# Patient Record
Sex: FEMALE | Race: OTHER
[De-identification: names, ages, dates, MRNs, and addresses within clinical notes are randomized per-mention and may not be internally consistent; named-entity substitution may affect disease eponyms.]

---

## 2020-11-20 ENCOUNTER — APPOINTMENT (OUTPATIENT)
Dept: GASTROENTEROLOGY | Facility: CLINIC | Age: 40
End: 2020-11-20
Payer: MEDICAID

## 2020-11-20 VITALS
BODY MASS INDEX: 30.36 KG/M2 | SYSTOLIC BLOOD PRESSURE: 116 MMHG | HEART RATE: 66 BPM | TEMPERATURE: 96.1 F | DIASTOLIC BLOOD PRESSURE: 78 MMHG | OXYGEN SATURATION: 98 % | WEIGHT: 165 LBS | RESPIRATION RATE: 14 BRPM | HEIGHT: 62 IN

## 2020-11-20 PROCEDURE — 99203 OFFICE O/P NEW LOW 30 MIN: CPT

## 2020-11-20 NOTE — PHYSICAL EXAM
[Abdomen Soft] : soft [Abdomen Tenderness] : non-tender [] : no hepato-splenomegaly [Abdomen Mass (___ Cm)] : no abdominal mass palpated [FreeTextEntry1] : patient preferred postponing rectal

## 2020-11-20 NOTE — ASSESSMENT
[FreeTextEntry1] : constipation\par \par reviewed the records from ed\par kub; extensive stool in colon\par \par plan\par \par dietary fiber to be increased\par \par DR FIELD'S COMMON SENSE RECOMMENDATIONS FOR IMPROVING CONSTIPATION,  WITH OR WITHOUT PRESCRIPTION MEDICATION\par \par 1.  slowly increase dietary fiber\par 2.  breakfast is especially important for good bowel regime\par 3.  high fiber breakfast cereal such as Kashi or Fiber 1 is a useful way to increase dietary fiber\par 4.  hot beverage or hot cereal may also be helpful at breakfast\par 5.  fluid intake to avoid dehydration;  eight glasses of non caffeinated, non alcoholic fluids per day [64 fll oz]\par 6.  prunes can be helpful; 3-6 per day [alternative is prune juice]\par 7.  add unprocessed bran to foods, beginning with one teaspoon per day\par 8   add flaxseed to foods, starting with one tablespoon and increasing slowly\par \par \par return visit in six weeks\par colonoscopy unless doing really\par \par smoothie with flax or keith seed also is a good idea

## 2020-11-20 NOTE — HISTORY OF PRESENT ILLNESS
[FreeTextEntry1] : patient with recent onset of marble like hard stools, actually strained, presented to ed with vaso motor syncope related to straining\par \par since that time, she is working on diet\par better but still with marble like stools, not a large volume stool \par \par no meds, no pain killers\par \par no family hx of colon cancer\par \par she is in good health\par \par three children, 15 12 and five y old

## 2020-12-30 ENCOUNTER — APPOINTMENT (OUTPATIENT)
Dept: GASTROENTEROLOGY | Facility: CLINIC | Age: 40
End: 2020-12-30
Payer: MEDICAID

## 2020-12-30 PROCEDURE — 99442: CPT

## 2020-12-30 NOTE — HISTORY OF PRESENT ILLNESS
[FreeTextEntry1] : 1. chronic constipation\par \par she has done the best she can for her diet\par \par has used a spoonful of flax seed mixed with cactus\par \par but it contains senna\par \par her bowel movements are somewhat improved\par \par smoothie...every other day\par \par difficult to maintain daily\par \par

## 2021-01-18 ENCOUNTER — RESULT REVIEW (OUTPATIENT)
Age: 41
End: 2021-01-18

## 2021-01-20 ENCOUNTER — RESULT REVIEW (OUTPATIENT)
Age: 41
End: 2021-01-20

## 2021-01-21 ENCOUNTER — RESULT REVIEW (OUTPATIENT)
Age: 41
End: 2021-01-21

## 2021-01-21 ENCOUNTER — APPOINTMENT (OUTPATIENT)
Dept: GASTROENTEROLOGY | Facility: HOSPITAL | Age: 41
End: 2021-01-21

## 2021-02-12 ENCOUNTER — APPOINTMENT (OUTPATIENT)
Dept: NEUROLOGY | Facility: CLINIC | Age: 41
End: 2021-02-12
Payer: MEDICAID

## 2021-02-12 VITALS
BODY MASS INDEX: 29.81 KG/M2 | HEIGHT: 62 IN | WEIGHT: 162 LBS | SYSTOLIC BLOOD PRESSURE: 131 MMHG | DIASTOLIC BLOOD PRESSURE: 83 MMHG | TEMPERATURE: 97.2 F | HEART RATE: 73 BPM

## 2021-02-12 DIAGNOSIS — Z82.49 FAMILY HISTORY OF ISCHEMIC HEART DISEASE AND OTHER DISEASES OF THE CIRCULATORY SYSTEM: ICD-10-CM

## 2021-02-12 DIAGNOSIS — Z83.3 FAMILY HISTORY OF DIABETES MELLITUS: ICD-10-CM

## 2021-02-12 DIAGNOSIS — Z78.9 OTHER SPECIFIED HEALTH STATUS: ICD-10-CM

## 2021-02-12 DIAGNOSIS — Z87.440 PERSONAL HISTORY OF URINARY (TRACT) INFECTIONS: ICD-10-CM

## 2021-02-12 PROCEDURE — 99072 ADDL SUPL MATRL&STAF TM PHE: CPT

## 2021-02-12 PROCEDURE — 99204 OFFICE O/P NEW MOD 45 MIN: CPT

## 2021-02-14 PROBLEM — Z87.440 HISTORY OF URINARY TRACT INFECTION: Status: RESOLVED | Noted: 2021-02-12 | Resolved: 2021-02-14

## 2021-02-14 PROBLEM — Z82.49 FAMILY HISTORY OF HYPERTENSION: Status: ACTIVE | Noted: 2021-02-14

## 2021-02-14 PROBLEM — Z82.49 FAMILY HISTORY OF CARDIAC DISORDER: Status: ACTIVE | Noted: 2021-02-14

## 2021-02-14 PROBLEM — Z78.9 NON-SMOKER: Status: ACTIVE | Noted: 2021-02-12

## 2021-02-14 PROBLEM — Z83.3 FAMILY HISTORY OF DIABETES MELLITUS: Status: ACTIVE | Noted: 2021-02-14

## 2021-02-14 NOTE — DISCUSSION/SUMMARY
[FreeTextEntry1] : Nara is a pleasant 40-year-old lady with history of four seizures, first seizure at age 33, with second seizure years later and now two seizures more recently, with last seizure 2/10/21. \par \par First two seizures appear to be focal motor seizures characterized by arousal from sleep and  hypermotor behavior with retained awareness. Last two seizures occurred out of sleep and appear to be focal to btc seizures.\par \par Given history of 4 seizures, she has epilepsy. Also has right insular hyperintense focus, unclear etiology, may have been a postictal focus or post-infectious etiology but this may be seizure focus with variable propogation --- propogation anteriorly to frontal lobe with hypermotor seizure, and to more distributed networks for focal to btc seizure. \par \par Gertrude was tearful today when discussing diagnosis of possible epilepsy as with her first two episodes years ago, she was told they were likely panic attacks. She has stressors including her mother visiting and was very tearful in office today. She was started on levetiracetam with no interval events although this may not be the best medication for her given possible comorbid anxiety and depression. Her reaction may just be expected as this is new diagnosis and she was told she also couldn't' drive which is very disruptive to her life. Will require close follow-up initially. \par Has allergy to Bactrim which may predispose to problem with lamotrigine. Has IUD, no plan for more children. \par

## 2021-02-14 NOTE — PHYSICAL EXAM
[FreeTextEntry1] : Mental Status: AxOx3, speech: no dysarthria, tearful, dysthymic affect, attention normal, knows president, can calculate number of quarters in $1.50\par STM 3/3 immediate, 2/3 at five minutes, \par CN: visual acuity, VFF, blink to confrontation \par III, IV, VI, PERRL, EOMI \par V sensation normal to light touch, pinprick\par VII normal squint vs resistance, normal smile, face symmetric \par VIII: normal hearing \par IX, X normal gag, symmetric palate, uvula raises midline \par XI normal shrug versus resistance and lateralization of head versus resistance \par XII tongue symmetric, normal strength, no tremor fasciculations \par Motor: full strength throughout , normal tone \par Sensory: normal to LT and vibration \par Reflexes:  \par Brachoradialis, biceps, triceps, patella and ankles 2+ \par Plantar flexor response bilaterally \par Coordination: no dysmetria on FNF \par Gait : normal balance and gait, no ataxic movements

## 2021-02-14 NOTE — REVIEW OF SYSTEMS
[Seizures] : convulsions [Anxiety] : anxiety [Depression] : depression [Fever] : no fever [Chest Pain] : no chest pain [Confused or Disoriented] : no confusion [Abdominal Pain] : no abdominal pain [Pelvic Pain] : no pelvic pain

## 2021-02-14 NOTE — REVIEW OF SYSTEMS
[Seizures] : convulsions [Anxiety] : anxiety [Depression] : depression [Fever] : no fever [Confused or Disoriented] : no confusion [Chest Pain] : no chest pain [Abdominal Pain] : no abdominal pain [Pelvic Pain] : no pelvic pain

## 2021-02-14 NOTE — CONSULT LETTER
[Dear  ___] : Dear ~SELENA, [Sincerely,] : Sincerely, [FreeTextEntry3] : Kadi Brunner MD \par [facility] \par [phone]

## 2021-02-14 NOTE — HISTORY OF PRESENT ILLNESS
[FreeTextEntry1] : Dinorah Reyes is a 40-year-old right-handed lady with a past medical history of four clinical events concerning for seizure. She is presenting after hospital follow-up for a seizure. \par \par As per her report, her first seizure was in [place] in 2013. She was 33-years old at the time.  She felt tired and fell asleep. Woke up making sudden involuntary movements with her hands, R>L. She could not control the movements. She bit her tongue. No drooling or difficulty breathing. She was aware of what was happening but had no control. She fell asleep and awoke again and her tongue was bruised. She went to [facility] in [facility] and was told it may have been a panic attack. She was sent home. Prior to the event, she had no illness. \par \par Second event was out of sleep in 2019, six years later. Her  was sleeping. She awoke out of sleep and had involuntary movements, maybe of right hand greater than left with mouth movements. The event lasted 15-20 seconds. No loss of consciousness, no loss of urine, bit both sides of tongue. She was evaluated by [facility] and was told t his may be a panic attack. \par \par Third seizure was on 1/31/21 around 6 :20 AM.  awoke and witnessed whole body convulsions and foaming at the mouth. He turned her onto her right side more than left. The event lasted 20-30 seconds and then stopped. She was breathing heavily and had lost consciousness. She felt sore afterwards. \par \par The last seizure was on 2/10/21. She was brought to [place] [facility] ED> She was postictal and unable to provide detailed history. As per chart notes,  ED called  who reported she was having generalized tonic clonic seizure with foaming and the mouth and bit her tongue. Seizure lasted a few minutes. Upon arrival to ED, BP was 165/82. O2 96%. She was afebrile and tachycardic at 110. She had tongue bite on right side with edema. No sign of infection. MRI brain showed T2 nonenhancing hyperintense focus within the anterior right insula, unclear etiology. She was discharged on  mg bid. \par \par \par Seizure risk factors: \par No history of brain infection, no family history of epilepsy, no history of meningitis, encephalitis. No history of learning problems. Met developmental milestones okay. No history of episode of head injury with loss of consciousness. Did have episode of high fever, was hallucinating and had visual hallucinations in setting of delirium in the past. 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

## 2021-02-14 NOTE — DATA REVIEWED
[de-identified] : 2/10/21: MRI brain with and without contrast: T2 nonenhancing non masslike hyperintense focus, centered within the anterior right insula corresponding to findings seen on recently performed CT head, nonspecific. Possible diagnostic consideration include prominent focus of gliosis from prior ischemia versus postinfectious/post inflammatory process or post ictal focus. Low - grade glial neoplasm is considered unlikely given morphology and lack of mass effect and enhancement but not entirely excluded. Follow up suggested. No evidence of mesial temporal sclerosis.  [de-identified] : 2/10/21: Creatinine 0.8, AST ALT Alk phos within normal limits

## 2021-02-14 NOTE — ASSESSMENT
[FreeTextEntry1] : -Continue  mg bid - will send prescription to pharmacy \par \par - folic acid 0.4 mg qdaily\par \par if continued depression - will need to add transition medications possibly to trileptal - safe in pregnancy \par \par Will send prescription of intranasal midazolam home \par \par  is supportive and present at night - can witness nocturnal seizures - will get empatica device \par \par No driving for at least 6 months to 1 year \par \par Referred them to epilepsy foundation website for more resources \par \par Referral to neurosurgery due to right insular lesion, suspect this was post-ictal focus or old area of gliosis but would like input regarding when to do follow-up imaging/impression from Dr. Tejada \par \par Return to clinic in one month \par

## 2021-02-14 NOTE — CONSULT LETTER
[Dear  ___] : Dear ~SELENA, [Sincerely,] : Sincerely, [FreeTextEntry3] : Kadi Brunner MD \par [facility] \par [phone]  none present none present

## 2021-02-14 NOTE — HISTORY OF PRESENT ILLNESS
[FreeTextEntry1] : Dinorah Reyes is a 40-year-old right-handed lady with a past medical history of four clinical events concerning for seizure. She is presenting after hospital follow-up for a seizure. \par \par As per her report, her first seizure was in [place] in 2013. She was 33-years old at the time.  She felt tired and fell asleep. Woke up making sudden involuntary movements with her hands, R>L. She could not control the movements. She bit her tongue. No drooling or difficulty breathing. She was aware of what was happening but had no control. She fell asleep and awoke again and her tongue was bruised. She went to [facility] in [facility] and was told it may have been a panic attack. She was sent home. Prior to the event, she had no illness. \par \par Second event was out of sleep in 2019, six years later. Her  was sleeping. She awoke out of sleep and had involuntary movements, maybe of right hand greater than left with mouth movements. The event lasted 15-20 seconds. No loss of consciousness, no loss of urine, bit both sides of tongue. She was evaluated by [facility] and was told t his may be a panic attack. \par \par Third seizure was on 1/31/21 around 6 :20 AM.  awoke and witnessed whole body convulsions and foaming at the mouth. He turned her onto her right side more than left. The event lasted 20-30 seconds and then stopped. She was breathing heavily and had lost consciousness. She felt sore afterwards. \par \par The last seizure was on 2/10/21. She was brought to [place] ED> She was postictal and unable to provide detailed history. As per chart notes,  ED called  who reported she was having generalized tonic clonic seizure with foaming and the mouth and bit her tongue. Seizure lasted a few minutes. Upon arrival to ED, BP was 165/82. O2 96%. She was afebrile and tachycardic at 110. She had tongue bite on right side with edema. No sign of infection. MRI brain showed T2 nonenhancing hyperintense focus within the anterior right insula, unclear etiology. She was discharged on  mg bid. \par \par \par Seizure risk factors: \par No history of brain infection, no family history of epilepsy, no history of meningitis, encephalitis. No history of learning problems. Met developmental milestones okay. No history of episode of head injury with loss of consciousness. Did have episode of high fever, was hallucinating and had visual hallucinations in setting of delirium in the past. 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

## 2021-02-14 NOTE — DATA REVIEWED
[de-identified] : 2/10/21: MRI brain with and without contrast: T2 nonenhancing non masslike hyperintense focus, centered within the anterior right insula corresponding to findings seen on recently performed CT head, nonspecific. Possible diagnostic consideration include prominent focus of gliosis from prior ischemia versus postinfectious/post inflammatory process or post ictal focus. Low - grade glial neoplasm is considered unlikely given morphology and lack of mass effect and enhancement but not entirely excluded. Follow up suggested. No evidence of mesial temporal sclerosis.  [de-identified] : 2/10/21: Creatinine 0.8, AST ALT Alk phos within normal limits

## 2021-03-01 ENCOUNTER — APPOINTMENT (OUTPATIENT)
Dept: NEUROSURGERY | Facility: CLINIC | Age: 41
End: 2021-03-01
Payer: MEDICAID

## 2021-03-01 VITALS
TEMPERATURE: 97.3 F | WEIGHT: 160 LBS | HEIGHT: 62 IN | DIASTOLIC BLOOD PRESSURE: 76 MMHG | HEART RATE: 78 BPM | SYSTOLIC BLOOD PRESSURE: 122 MMHG | BODY MASS INDEX: 29.44 KG/M2

## 2021-03-01 PROCEDURE — 99072 ADDL SUPL MATRL&STAF TM PHE: CPT

## 2021-03-01 PROCEDURE — 99205 OFFICE O/P NEW HI 60 MIN: CPT

## 2021-03-01 NOTE — DATA REVIEWED
[de-identified] : 2/10/21: No hydrocephalus, midline shift, acute intracranial hemorrhage or demarcated territorial infarct [de-identified] : 2/10/21: T2 non-enhancing non-masslike hyperintense focus centered within the anterior right insula corresponding to findings seen on recently performed CT head which is nonspecific and possible diagnostic consideration include prominent focus of gliosis from prior ischemia versus postinfectious/post inflammatory process or postictal focus.  Low-grade glial neoplasm is considered nlikely given morphology f finding as well as lack of surrounding mass effect and enhancement but is not entirely excluded.  Attention on routine follow-up imaging may be helpful to assess for stability may be obtained, as clinically warranted.  No enhancing intracranial lesion identified.  No acute intracranial hemorrhage, acute infarction, extra-axial fluid collection or hydrocephalus.

## 2021-03-01 NOTE — ASSESSMENT
[FreeTextEntry1] : I have discussed the natural history and treatment options for intracranial brain lesions with the patient. I explained the different types of brain lesions and the indications for observation and imaging surveillance, medical management with antiepileptic medications and steroids, chemotherapy, radiation therapy, radiosurgery and surgery. I explained the indications of a combination of these treatment options.  In the end, I recommend imaging surveillance, with follow up in 3 months with repeat MRI brain with and without contrast prior to visit.  The patient understands the plan of care and is in agreement.  All questions answered to patient satisfaction.\par \par KEZIA Krishna\par \par

## 2021-03-01 NOTE — HISTORY OF PRESENT ILLNESS
[de-identified] : DINORAH REYES is a 40 year female with a PMH of Anemia,  UTI, four seizure episodes in her lifetime including recent seizure 2/10/21 seen in [place] ED who presents to the office today as a referral from Dr. Brunner for neurosurgical consultation due to abnormality on recent MRI.  \par \par Ms. Reyes had episode of GTC seizure lasting a few minutes with post ictal state, tongue-bite. CT head showed no hemorrhage.  MRI brain revealed T2 nonenhancing hyperintense focus within the right insula. She was discharged on Keppra 500mg bid and is scheduled for outpatient EEG.  She denies headaches, weakness, numbness, gait abnormality or additional recent seizures.

## 2021-03-01 NOTE — END OF VISIT
[FreeTextEntry3] : I have seen the patient and reviewed the case together with PA and I agree with the final recommendations and plan of care.\par \par Steve Tejada MD\par Neurosurgery\par \par  [Time Spent: ___ minutes] : I have spent [unfilled] minutes of time on the encounter. [>50% of the face to face encounter time was spent on counseling and/or coordination of care for ___] : Greater than 50% of the face to face encounter time was spent on counseling and/or coordination of care for [unfilled]

## 2021-03-12 ENCOUNTER — APPOINTMENT (OUTPATIENT)
Dept: NEUROLOGY | Facility: CLINIC | Age: 41
End: 2021-03-12
Payer: MEDICAID

## 2021-03-12 VITALS
TEMPERATURE: 97.2 F | HEIGHT: 62 IN | DIASTOLIC BLOOD PRESSURE: 54 MMHG | BODY MASS INDEX: 29.08 KG/M2 | HEART RATE: 68 BPM | WEIGHT: 158 LBS | SYSTOLIC BLOOD PRESSURE: 123 MMHG

## 2021-03-12 PROCEDURE — 99072 ADDL SUPL MATRL&STAF TM PHE: CPT

## 2021-03-12 PROCEDURE — 99215 OFFICE O/P EST HI 40 MIN: CPT

## 2021-03-13 NOTE — PHYSICAL EXAM
[FreeTextEntry1] : Mental Status: AxOx3, speech: no dysarthria, euthymic affect, attention normal, knows president \par CN: visual acuity, VFF, blink to confrontation \par III, IV, VI, PERRL, EOMI \par V sensation normal to light touch, pinprick\par VII normal squint vs resistance, normal smile, face symmetric \par VIII: normal hearing \par IX, X normal gag, symmetric palate, uvula raises midline \par XI normal shrug versus resistance and lateralization of head versus resistance \par XII tongue symmetric, normal strength, no tremor fasciculations \par Motor: full strength throughout , normal tone \par Sensory: normal to LT and vibration \par Reflexes: \par Brachioradialis, biceps, triceps, patella and ankles 2+ \par Plantar flexor response bilaterally \par Coordination: no dysmetria on FNF \par Gait : normal balance and gait, no ataxic movements.

## 2021-03-13 NOTE — ASSESSMENT
[FreeTextEntry1] : Doing well. No interval seizures. \par \par Plan: \par Continue  mg bid \par folici acid 400 mcg qd - incase unexpected pregnancy \par magnesium oxide prn difficulty sleeping \par start vitamin b complex \par continue vitamin d \par routine and ambulatory EEG \par Continue vitamin d supplementation, recommend b-complex \par return to clinic in 2 months or sooner if seizure \par no driving, discussed seizure safety- zeus candelario, seizure safety device

## 2021-03-13 NOTE — HISTORY OF PRESENT ILLNESS
[FreeTextEntry1] : Last seen in clinic on 2/12/21. Doing well. No clinical events concerning for seizure. Having a lot of stressful interactions with family members, particularly mother. She has a lot of anger when she comes home from work and the house is a mess. \par \par Otherwise tolerating the levetiracetam okay. She has [employer] seizure detection watch. Therefore her  can know if she is having seizures - she has nocturnal seizures and sometimes he doesn't wake up. \par They did not want to start clobazam due to concern for side-effects.  \par \par Met with Dr. Tejada. Will do surveillance imaging for right insular lesion, low suspicion for malignancy. \par ______________\par Dinorah Reyes is a 40-year-old right-handed lady with a past medical history of four clinical events concerning for seizure. She is presenting after hospital follow-up for a seizure. \par \par As per her report, her first seizure was in [place] in 2013. She was 33-years old at the time.  She felt tired and fell asleep. Woke up making sudden involuntary movements with her hands, R>L. She could not control the movements. She bit her tongue. No drooling or difficulty breathing. She was aware of what was happening but had no control. She fell asleep and awoke again and her tongue was bruised. She went to [facility] in [facility] and was told it may have been a panic attack. She was sent home. Prior to the event, she had no illness. \par \par Second event was out of sleep in 2019, six years later. Her  was sleeping. She awoke out of sleep and had involuntary movements, maybe of right hand greater than left with mouth movements. The event lasted 15-20 seconds. No loss of consciousness, no loss of urine, bit both sides of tongue. She was evaluated by [facility] and was told t his may be a panic attack. \par \par Third seizure was on 1/31/21 around 6 :20 AM.  awoke and witnessed whole body convulsions and foaming at the mouth. He turned her onto her right side more than left. The event lasted 20-30 seconds and then stopped. She was breathing heavily and had lost consciousness. She felt sore afterwards. \par \par The last seizure was on 2/10/21. She was brought to [place] ED> She was postictal and unable to provide detailed history. As per chart notes,  ED called  who reported she was having generalized tonic clonic seizure with foaming and the mouth and bit her tongue. Seizure lasted a few minutes. Upon arrival to ED, BP was 165/82. O2 96%. She was afebrile and tachycardic at 110. She had tongue bite on right side with edema. No sign of infection. MRI brain showed T2 nonenhancing hyperintense focus within the anterior right insula, unclear etiology. She was discharged on  mg bid. \par \par \par Seizure risk factors: \par No history of brain infection, no family history of epilepsy, no history of meningitis, encephalitis. No history of learning problems. Met developmental milestones okay. No history of episode of head injury with loss of consciousness. Did have episode of high fever, was hallucinating and had visual hallucinations in setting of delirium in the past. 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

## 2021-03-13 NOTE — DISCUSSION/SUMMARY
[FreeTextEntry1] : Nara is a pleasant 40-year-old lady with history of four seizures, first seizure at age 33, with second seizure years later. 2 seizures more recently, with last seizure 2/10/21. Now on  mg bid with no interval seizures. Saw Dr. Tejada for right insular lesion, doubt neoplastic, will have surveillance imaging with him. \par \par First two seizures appear to be focal motor seizures characterized by arousal from sleep and  hypermotor behavior with retained awareness. Last two seizures occurred out of sleep and appear to be focal to btc seizures.\par \par Given history of 4 seizures, she has epilepsy. Also has right insular hyperintense focus, unclear etiology, may have been a postictal focus or post-infectious etiology but this may be seizure focus with variable propogation --- propogation anteriorly to frontal lobe with hypermotor seizure, and to more distributed networks for focal to btc seizure. \par \par Has allergy to Bactrim which may predispose to problem with lamotrigine. Has IUD, no plan for more children. \par

## 2021-03-19 ENCOUNTER — APPOINTMENT (OUTPATIENT)
Dept: NEUROLOGY | Facility: CLINIC | Age: 41
End: 2021-03-19
Payer: MEDICAID

## 2021-03-19 PROCEDURE — 99072 ADDL SUPL MATRL&STAF TM PHE: CPT

## 2021-03-19 PROCEDURE — 95819 EEG AWAKE AND ASLEEP: CPT

## 2021-03-20 PROCEDURE — 95700 EEG CONT REC W/VID EEG TECH: CPT

## 2021-03-20 PROCEDURE — 99072 ADDL SUPL MATRL&STAF TM PHE: CPT

## 2021-03-20 PROCEDURE — 95708 EEG WO VID EA 12-26HR UNMNTR: CPT

## 2021-03-20 PROCEDURE — 95719 EEG PHYS/QHP EA INCR W/O VID: CPT

## 2021-03-22 ENCOUNTER — APPOINTMENT (OUTPATIENT)
Dept: NEUROLOGY | Facility: CLINIC | Age: 41
End: 2021-03-22

## 2021-03-26 ENCOUNTER — APPOINTMENT (OUTPATIENT)
Dept: GASTROENTEROLOGY | Facility: CLINIC | Age: 41
End: 2021-03-26

## 2021-03-26 ENCOUNTER — APPOINTMENT (OUTPATIENT)
Dept: OBGYN | Facility: CLINIC | Age: 41
End: 2021-03-26
Payer: MEDICAID

## 2021-03-26 DIAGNOSIS — R92.2 INCONCLUSIVE MAMMOGRAM: ICD-10-CM

## 2021-03-26 DIAGNOSIS — Z01.419 ENCOUNTER FOR GYNECOLOGICAL EXAMINATION (GENERAL) (ROUTINE) W/OUT ABNORMAL FINDINGS: ICD-10-CM

## 2021-03-26 PROCEDURE — 99072 ADDL SUPL MATRL&STAF TM PHE: CPT

## 2021-03-26 PROCEDURE — 99385 PREV VISIT NEW AGE 18-39: CPT

## 2021-04-08 PROBLEM — Z01.419 ENCOUNTER FOR ANNUAL ROUTINE GYNECOLOGICAL EXAMINATION: Status: RESOLVED | Noted: 2021-04-08 | Resolved: 2021-04-22

## 2021-04-08 PROBLEM — Z01.419 ENCOUNTER FOR ANNUAL ROUTINE GYNECOLOGICAL EXAMINATION: Status: RESOLVED | Noted: 2021-03-26 | Resolved: 2021-04-08

## 2021-04-08 NOTE — HISTORY OF PRESENT ILLNESS
[FreeTextEntry1] : 40 y o P3 female presents for initiation of GYN care\par Last pap smear in 8/2020 at [facility]: reportedly benign\par Denies current GYN complaints other than intermittent malodorous vaginal discharge w/ mild irritation\par Reports normal bowel and bladder function\par Sexually active: uses IUD\par Has monthly menses: normal flow/duration\par Has h/o ovarian cyst which led to torsion and subsequent ovarian cystectomy\par Has been diagnosed with epilepsy and possible brain lesion: follows with neurology and is on Keppra\par

## 2021-04-16 ENCOUNTER — RX RENEWAL (OUTPATIENT)
Age: 41
End: 2021-04-16

## 2021-04-20 ENCOUNTER — RX RENEWAL (OUTPATIENT)
Age: 41
End: 2021-04-20

## 2021-04-26 ENCOUNTER — APPOINTMENT (OUTPATIENT)
Dept: NEUROLOGY | Facility: CLINIC | Age: 41
End: 2021-04-26
Payer: MEDICAID

## 2021-04-26 VITALS
HEART RATE: 56 BPM | SYSTOLIC BLOOD PRESSURE: 124 MMHG | TEMPERATURE: 97.2 F | WEIGHT: 155 LBS | DIASTOLIC BLOOD PRESSURE: 76 MMHG | HEIGHT: 62 IN | BODY MASS INDEX: 28.52 KG/M2

## 2021-04-26 PROCEDURE — 99215 OFFICE O/P EST HI 40 MIN: CPT

## 2021-04-26 PROCEDURE — 99072 ADDL SUPL MATRL&STAF TM PHE: CPT

## 2021-04-27 NOTE — DATA REVIEWED
[de-identified] : 2/10/21: MRI brain with and without contrast: T2 nonenhancing non masslike hyperintense focus, centered within the anterior right insula corresponding to findings seen on recently performed CT head, nonspecific. Possible diagnostic consideration include prominent focus of gliosis from prior ischemia versus postinfectious/post inflammatory process or post ictal focus. Low - grade glial neoplasm is considered unlikely given morphology and lack of mass effect and enhancement but not entirely excluded. Follow up suggested. No evidence of mesial temporal sclerosis.  [de-identified] : aEEG : 3/19-3/20/21: rare bilateral anterior temporal delta slowing suggestive of mild focal dysfunction in these regions. Low amplitude EEG. \par  [de-identified] : 2/10/21: Creatinine 0.8, AST ALT Alk phos within normal limits

## 2021-04-27 NOTE — PHYSICAL EXAM
[FreeTextEntry1] : Cervical: no lymph nodes or masses palpated \par Mental Status: AxOx3, speech: no dysarthria, euthymic affect, attention normal, knows president \par CN: visual acuity, VFF, blink to confrontation \par III, IV, VI, PERRL, EOMI \par V sensation normal to light touch \par VII normal squint vs resistance, normal smile, face symmetric \par VIII: normal hearing \par IX, X normal gag, symmetric palate, uvula raises midline \par XI normal shrug versus resistance and lateralization of head versus resistance \par XII tongue symmetric, normal strength, no tremor fasciculations \par Motor: full strength throughout \par Sensory: normal to LT \par Reflexes: \par Brachioradialis, biceps, triceps, patella and ankles 2+ \par Plantar flexor response bilaterally \par Coordination: no dysmetria on FNF \par Gait : normal balance and gait \par

## 2021-04-27 NOTE — DISCUSSION/SUMMARY
[FreeTextEntry1] : Nara is a pleasant 40-year-old lady with history of four seizures, first seizure at age 33, with second seizure years later. 2 seizures more recently, with last seizure 3/21/21. Now on  mg bid and clobazam 10 mg qhs. Doing well. No interval events concerning for seizure. Saw Dr. Tejada for right insular lesion, doubt neoplastic, will have surveillance imaging with him. \par \par Seizures appear to be focal motor seizures characterized by arousal from sleep and hypermotor behavior with retained awareness. Last three seizures occurred out of sleep and appear to be focal to btc seizures.\par \par Given history of >2 seizures over > 24 hours, she has epilepsy. Also has right insular hyperintense focus, unclear etiology, may have been a postictal focus or post-infectious etiology but this may be seizure focus with variable propogation --- propogation anteriorly to frontal lobe with hypermotor seizure, and to more distributed networks for focal to btc seizure. \par \par 3/19/21 Ambulatory EEG with rare bilateral anterior temporal delta slowing in drowsiness, no epileptiform potentials or seizures. \par \par Has allergy to Bactrim which may predispose to problem with lamotrigine. Has intrauterine IUD but would like to take it out due to irritation. Recommend IUD, possibly nexplanon as she is taking clobazam with unclear risk to an unborn fetus. Discussed this in detail. They have come Jainism objections to birth control but discussed importance of health/freedom from seizures. They would like a letter to show to the clergy which I am happy to provide. \par  \par

## 2021-04-27 NOTE — HISTORY OF PRESENT ILLNESS
[FreeTextEntry1] : Last seen in clinic on 3/12/21. Doing well. Feels more calm. Getting better sleep. Less angry outbursts. No interval clinical events concerning for seizure. Last seizure out of sleep witnessed by  was on 3/20/21. She was shaking from side to side. She was in a "vegetative state" for a few minutes afterwards. Felt confused afterwards. \par \par Sexually active. Now with IUD. Discussed risks of clobazam to a fetus. Patient and  are devout catholics and therefore are considering practicing abstinence. Thinks that current IUD is causing rash, BV. Considering taking it out. \par \par Tolerating medications well. No side-effects.  \par \par Reports high cervical anterior lump, not clear what this is. Painful to touch.\par \par Medications:\par  \par levetiracetam 500 mg bid \par clobazam 10 mg qhs \par ___________\par \par \par 3/22/21 \par Last seen in clinic on 2/12/21. Doing well. No clinical events concerning for seizure. Having a lot of stressful interactions with family members, particularly mother. She has a lot of anger when she comes home from work and the house is a mess. \par \par Otherwise tolerating the levetiracetam okay. She has [employer] seizure detection watch. Therefore her  can know if she is having seizures - she has nocturnal seizures and sometimes he doesn't wake up. \par They did not want to start clobazam due to concern for side-effects.  \par \par Met with Dr. Tejada. Will do surveillance imaging for right insular lesion, low suspicion for malignancy. \par ______________\par Dinorah Reyes is a 40-year-old right-handed lady with a past medical history of four clinical events concerning for seizure. She is presenting after hospital follow-up for a seizure. \par \par As per her report, her first seizure was in [place] in 2013. She was 33-years old at the time.  She felt tired and fell asleep. Woke up making sudden involuntary movements with her hands, R>L. She could not control the movements. She bit her tongue. No drooling or difficulty breathing. She was aware of what was happening but had no control. She fell asleep and awoke again and her tongue was bruised. She went to [facility] in [facility] and was told it may have been a panic attack. She was sent home. Prior to the event, she had no illness. \par \par Second event was out of sleep in 2019, six years later. Her  was sleeping. She awoke out of sleep and had involuntary movements, maybe of right hand greater than left with mouth movements. The event lasted 15-20 seconds. No loss of consciousness, no loss of urine, bit both sides of tongue. She was evaluated by [facility] and was told t his may be a panic attack. \par \par Third seizure was on 1/31/21 around 6 :20 AM.  awoke and witnessed whole body convulsions and foaming at the mouth. He turned her onto her right side more than left. The event lasted 20-30 seconds and then stopped. She was breathing heavily and had lost consciousness. She felt sore afterwards. \par \par The last seizure was on 2/10/21. She was brought to [facility]> She was postictal and unable to provide detailed history. As per chart notes,  ED called  who reported she was having generalized tonic clonic seizure with foaming and the mouth and bit her tongue. Seizure lasted a few minutes. Upon arrival to ED, BP was 165/82. O2 96%. She was afebrile and tachycardic at 110. She had tongue bite on right side with edema. No sign of infection. MRI brain showed T2 nonenhancing hyperintense focus within the anterior right insula, unclear etiology. She was discharged on  mg bid. \par \par \par Seizure risk factors: \par No history of brain infection, no family history of epilepsy, no history of meningitis, encephalitis. No history of learning problems. Met developmental milestones okay. No history of episode of head injury with loss of consciousness. Did have episode of high fever, was hallucinating and had visual hallucinations in setting of delirium in the past. \par \par Past Medical History:\par Anemia\par \par Surgical History \par 1/2015: ovarian cyst, torsion\par \par Allergies: Bactrim \par \par Social History: \par Lives with kids and  in [place], children are 5, 12, and 15 years of age \par Caregiver \par 10th grade education\par No drugs, rare alcohol \par \par Family History: \par Mother – 77: htn, DM \par Father – smoker \par Brother – open heart surgery \par

## 2021-04-27 NOTE — ASSESSMENT
[FreeTextEntry1] : Doing well. No seizures since 3/21/21. Did have seizure when dose of clobazam was decreased to 5 mg. \par \par Plan: \par Continue  mg bid \par Continue clobazam 10 mg qhs \par folici acid 400 mcg qd - incase unexpected pregnancy \par magnesium oxide prn difficulty sleeping \par start vitamin b complex \par continue vitamin d \par return to clinic in 2 months or sooner if seizure \par no driving, discussed seizure safety- has empatica, seizure safety device \par \par Will provide letter with regard to birth control

## 2021-04-29 ENCOUNTER — APPOINTMENT (OUTPATIENT)
Dept: GASTROENTEROLOGY | Facility: CLINIC | Age: 41
End: 2021-04-29
Payer: MEDICAID

## 2021-04-29 PROCEDURE — 99442: CPT

## 2021-04-29 NOTE — ASSESSMENT
[FreeTextEntry1] : 1.  chronic constipation;  much imprvoed , on diet, no laxatives\par \par 2.  focal seizure disorder, for several years, but recently diagnosed and she will continue with her neurologist\par \par More than 50% of the face to face time was devoted to counseling and /or coordination of care.  THis coordination of care may have included reviewing other medical notes and reports, and communicating with other health professionals\par

## 2021-04-29 NOTE — HISTORY OF PRESENT ILLNESS
[FreeTextEntry1] : 1.  telephonic visit, consent on file, patient at home, and i am at home, audio only, we are the only ones on the calll\par \par 1.  ibs constipation predominant\par \par \par bowel movements have signifcantly improved, no laxatives, the dietary changes have made a great difference...and she doesn’t use Senna no laxaives, and she is doing very well on her diet.\par \par \par \par \par [facility];  she does not have oneshe has the name of a pcp\par \par \par \par \par \par \par \par 2.  seizures, first in 2013  then 2018, and then several since..\par \par sounds like focal seizures, as she stay s awake\par \par she bites her tongue and thumb during these\par \par placed on Keppra\par \par the neurologist...dr Maria..Kannan...\par has been place on anti seizre meds since feb 9th..\par \par Keppra and one other medication started: Clobazan...\par \par and since the Clobazan begun, no further seizures\par the Keppra; dose is 500 mg bid...

## 2021-05-06 ENCOUNTER — APPOINTMENT (OUTPATIENT)
Dept: OBGYN | Facility: CLINIC | Age: 41
End: 2021-05-06
Payer: MEDICAID

## 2021-05-06 ENCOUNTER — NON-APPOINTMENT (OUTPATIENT)
Age: 41
End: 2021-05-06

## 2021-05-06 VITALS
DIASTOLIC BLOOD PRESSURE: 70 MMHG | WEIGHT: 156 LBS | SYSTOLIC BLOOD PRESSURE: 110 MMHG | HEIGHT: 62 IN | BODY MASS INDEX: 28.71 KG/M2

## 2021-05-06 PROCEDURE — 58301 REMOVE INTRAUTERINE DEVICE: CPT

## 2021-05-06 PROCEDURE — 99072 ADDL SUPL MATRL&STAF TM PHE: CPT

## 2021-05-07 RX ORDER — MAGNESIUM OXIDE 241.3 MG/1000MG
400 TABLET ORAL
Qty: 30 | Refills: 5 | Status: DISCONTINUED | COMMUNITY
Start: 2021-03-12 | End: 2021-05-07

## 2021-05-07 RX ORDER — LEVETIRACETAM 500 MG/1
500 TABLET, FILM COATED ORAL TWICE DAILY
Qty: 60 | Refills: 5 | Status: DISCONTINUED | COMMUNITY
Start: 2021-02-14 | End: 2021-05-07

## 2021-05-07 NOTE — ASSESSMENT
[FreeTextEntry1] : Discussed birth defects if becomes pregnant while taking current medications. \par \par BV panel sent.

## 2021-05-07 NOTE — PROCEDURE
[IUD Removal] : intrauterine device (IUD) removal [Risks] : risks [Benefits] : benefits [Patient] : patient [Speculum Placed] : speculum placed [IUD Removed - Forceps] : IUD removed - forceps [IUD Discarded] : IUD discarded [Tolerated Well] : Patient tolerated the procedure well [No Complications] : no complications [Heavy Vaginal Bleeding] : for heavy vaginal bleeding [Pelvic Pain] : for pelvic pain [PRN] : as needed [de-identified] : Catholic beliefs. Practicing abstinence. Recurrent yeast infections.

## 2021-05-10 LAB
CANDIDA VAG CYTO: NOT DETECTED
G VAGINALIS+PREV SP MTYP VAG QL MICRO: NOT DETECTED
T VAGINALIS VAG QL WET PREP: NOT DETECTED

## 2021-05-17 ENCOUNTER — RESULT REVIEW (OUTPATIENT)
Age: 41
End: 2021-05-17

## 2021-05-22 ENCOUNTER — NON-APPOINTMENT (OUTPATIENT)
Age: 41
End: 2021-05-22

## 2021-05-24 ENCOUNTER — APPOINTMENT (OUTPATIENT)
Dept: NEUROSURGERY | Facility: CLINIC | Age: 41
End: 2021-05-24
Payer: MEDICAID

## 2021-05-24 PROCEDURE — 99215 OFFICE O/P EST HI 40 MIN: CPT

## 2021-05-24 NOTE — ASSESSMENT
[FreeTextEntry1] : I have discussed the natural history and treatment options for intracranial brain lesions with the patient. I explained the different types of brain lesions and the indications for observation and imaging surveillance, medical management with antiepileptic medications and steroids, chemotherapy, radiation therapy, radiosurgery and surgery. I explained the indications of a combination of these treatment options. In the end, I recommend imaging surveillance, with follow up in 12 months with repeat MRI brain with and without contrast prior to visit. She should continue her regular Epilepsy follow up with Dr. Brunner as scheduled. The patient understands the plan of care and is in agreement. All questions answered to patient satisfaction.

## 2021-05-24 NOTE — PHYSICAL EXAM
[General Appearance - Alert] : alert [General Appearance - In No Acute Distress] : in no acute distress [Person] : oriented to person [Place] : oriented to place [Time] : oriented to time [Remote Intact] : remote memory intact [Short Term Intact] : short term memory intact [Span Intact] : the attention span was normal [Concentration Intact] : normal concentrating ability [Fluency] : fluency intact [Comprehension] : comprehension intact [Current Events] : adequate knowledge of current events [Past History] : adequate knowledge of personal past history [Vocabulary] : adequate range of vocabulary [Cranial Nerves Optic (II)] : visual acuity intact bilaterally,  pupils equal round and reactive to light [Cranial Nerves Oculomotor (III)] : extraocular motion intact [Cranial Nerves Trigeminal (V)] : facial sensation intact symmetrically [Cranial Nerves Vestibulocochlear (VIII)] : hearing was intact bilaterally [Cranial Nerves Facial (VII)] : face symmetrical [Cranial Nerves Glossopharyngeal (IX)] : tongue and palate midline [Cranial Nerves Accessory (XI - Cranial And Spinal)] : head turning and shoulder shrug symmetric [Cranial Nerves Hypoglossal (XII)] : there was no tongue deviation with protrusion [Motor Tone] : muscle tone was normal in all four extremities [Motor Strength] : muscle strength was normal in all four extremities [No Muscle Atrophy] : normal bulk in all four extremities [Sensation Tactile Decrease] : light touch was intact [Abnormal Walk] : normal gait [Balance] : balance was intact [2+] : Patella left 2+ [Past-pointing] : there was no past-pointing [Tremor] : no tremor present

## 2021-05-24 NOTE — REASON FOR VISIT
[FreeTextEntry1] : Ms. Reyes returns today for interval follow up for seizures and T2 nonenhancing Right insular hyperintensity on MRI done in February.  She has had one breakthrough seizure last week and her clobazam was increased and she was continued on her current dose of keppra.  Follow up MRI +/- brain 5/19/21 was unchanged from prior study of 2/10/21. see report below\par \par 3/1/21: DINORAH REYES is a 40 year female with a PMH of Anemia, UTI, four seizure episodes in her lifetime including recent seizure 2/10/21 seen in [facility] who presents to the office today as a referral from Dr. Brunner for neurosurgical consultation due to abnormality on recent MRI. \par \par Ms. Reyes had episode of GTC seizure lasting a few minutes with post ictal state, tongue-bite. CT head showed no hemorrhage. MRI brain revealed T2 nonenhancing hyperintense focus within the right insula. She was discharged on Keppra 500mg bid and is scheduled for outpatient EEG. She denies headaches, weakness, numbness, gait abnormality or additional recent seizures.

## 2021-05-24 NOTE — DATA REVIEWED
[de-identified] : \par  [facility] MRI Report             Final\par \par No Documents Attached\par \par \par \par \par   [facility]\par                                          [address]\par                                    [place], [place]  [address]\par                                        Department of Radiology\par                                             [phone]\par \par \par Patient Name:      REYES,DINORAH                Location:       [facility]\par Med Rec #:        [id number]                    Account #:      [id number]\par YOB: 1980                    Ordering:       Tra Desouza\par Age: 40               Sex:    F                 Attending:      Tra Desouza\par PCP:        NO PRIMARY CARE PHYSICIAN\par ______________________________________________________________________________________\par \par Exam Date:      05/17/21\par Exam:         MRI BRAIN WAW IC\par Order#:       MRI [id number]\par \par \par \par EXAM: MRI brain with and without contrast\par \par  INDICATION: Seizures with abnormality seen on recent imaging\par \par  TECHNIQUE: MR examination of brain performed with and without contrast utilizing axial diffusion-\par weighted/ADC, axial T2 FLAIR, sagittal T2 FLAIR, coronal T2 FLAIR, axial T1, sagittal T1, coronal\par T1, axial susceptibility, sagittal/access coronal T1 postcontrast, coronal T2/FLAIR hippocampal\par weighted sequences.\par \par  7.5 mL of Gadavist administered intravenously\par \par  COMPARISON: February 10, 2021 MRI brain February 10, 2021 CT head\par \par  FINDINGS:\par \par  Motion degraded exam.\par \par  Ventricles are normal in size and configuration for patient's age. No hydrocephalus or extra-axial\par fluid collection.\par \par  No abnormal restricted diffusion identified to suggest acute territorial infarction. No acute\par intracranial hemorrhage. Punctuate foci of subcortical white matter T2 hyperintensity, nonspecific\par in this age demographic and may reflect sequela of minimal chronic microvascular ischemia versus\par gliosis of other etiologies. There is a T2 hyperintense focus centered within the anterior right\par insula (28:15) which does not demonstrate associated enhancement, T1 weighted hyperintensity\par restricted diffusion or susceptibility hypointensity and is unchanged in appearance from the\par February 10, 2021 MRI brain. There is questionable subtle increased signal within the left insula\par without enhancement which is also unchanged and appears more conspicuous in the current exam, series\par 11, image 18. There is no significant midline shift, mass effect or herniation. Susceptibility\par weighted sequences are within normal limits without evidence for chronic blood products. No abnormal\par parenchymal, leptomeningeal or pachymeningeal enhancement identified. No enhancing intracranial\par lesion identified.\par \par  The hippocampi are symmetric in signal and morphology. No asymmetric atrophy of the fornices or\par mammillary bodies are identified. No MR evidence for mesial temporal sclerosis.\par \par  Visualized proximal arterial and dural venous sinus flow voids preserved on spin-echo sequences.\par Mild mucosal thickening of the anterior ethmoid air cells and bilateral maxillary sinuses. Mastoid\par air cells are well aerated.\par \par  No suspicious expansile or destructive calvarial lesion identified.\par \par  Sella and suprasellar region are unremarkable.\par \par \par \par  IMPRESSION:\par \par  T2 nonenhancing non-masslike hyperintense focus centered within the anterior right insula unchanged\par in appearance from the February 10, 2021 exam, possible diagnostic considerations include\par epileptogenic/postictal focus, gliosis from prior ischemia versus postinfectious/post inflammatory\par process such as autoimmune\par  encephalitis. Low-grade intracranial neoplasms such as ganglioglioma, dysembryoplastic\par neuroepithelial tumors are considered unlikely given the non-masslike appearance of this finding as\par well as atypical location.\par  No enhancing intracranial lesion identified.\par \par  No acute intracranial hemorrhage, acute infarction, extra-axial fluid collection or hydrocephalus.\par  No MR evidence for mesial temporal sclerosis.\par \par \par \par ***Electronically Signed ***\par -----------------------------------------------\par Bandar D. Jones Rosenberg MD              05/17/21 1359\par \par Dictated on 05/17/21\par \par \par Report cc:  Tra Desouza;\par \par  \par \par  Ordered by: TRA DESOUZA       Collected/Examined: 17May2021 10:53AM       \par Verified by: TRA DESOUZA 19May2021 11:35AM       \par  Result Communication: No patient communication needed at this time;\par Stage: Final       \par  Performed at: [facility]       Resulted: [id number] 01:01PM       Last Updated: 19May2021 11:35AM       Accession: [id number]

## 2021-05-25 ENCOUNTER — APPOINTMENT (OUTPATIENT)
Dept: NEUROLOGY | Facility: CLINIC | Age: 41
End: 2021-05-25
Payer: MEDICAID

## 2021-05-25 VITALS
HEART RATE: 57 BPM | WEIGHT: 155 LBS | SYSTOLIC BLOOD PRESSURE: 119 MMHG | TEMPERATURE: 97 F | HEIGHT: 62 IN | BODY MASS INDEX: 28.52 KG/M2 | DIASTOLIC BLOOD PRESSURE: 46 MMHG

## 2021-05-25 PROCEDURE — 99215 OFFICE O/P EST HI 40 MIN: CPT

## 2021-05-27 NOTE — DISCUSSION/SUMMARY
[FreeTextEntry1] : Gertrude is a pleasant 40-year-old lady with history of epilepsy. First seizure was at age 33, with second seizure years later. 2 seizures more recently, with last seizure 3/21/21. Now on  mg bid and clobazam 15 mg qhs. Doing well. no interval events concerning for seizure. Saw Dr. Tejada for right insula lesion, doubt neoplastic, will have surveillance imaging follow-up within one year - 6/2022. \par \par Seizures appear to be focal motor seizures. Occur out of sleep with some seizures which were focal aware and others which appear to be focal to bilateral tonic clonic seizures with generalized shaking and foaming at the mouth. \par \par Has had ~ 5 lifetime seizures. Most recent seizure 5/2021 out of sleep. She may have had left-sided tingling, and then woke up her . This indicates she had an aura. He then witnessed convulsion with foaming at the mouth. Brief, < 30 seconds. This seizure was in the setting of compliance with AEDS.\par \par  Has right insular hyperintense focus,  unclear etiology, may be post-infectious. Appears to have seizures with variable propagation. Possible she has prorogation anteriorly to the frontal lobe with hypermotor seizure and to more distributed networks for focal to bilateral tonic clonic seizure. Left-sided tingling, electric like sensation may point to insular involvement. \par \par Ambulatory EEG 3/2021 with rare bilateral anterior temporal delta slowing in drowsiness, no epileptiform potential or sizures. \par \par Allergy to Bactrim which may predispose to problem with lamotrigine. \par \par \par

## 2021-05-27 NOTE — HISTORY OF PRESENT ILLNESS
[FreeTextEntry1] : Last seen in clinic on 5/22/21. Doing okay. Had breakthrough seizure in setting of compliance with medications on 5/22/21. Gertrude has little memory but thinks she may have felt electricity on the left side of her body from her head to her toes.  She then made a loud sound and woke up her partner who was sleeping next to her. He saw shaking and foaming at the mouth. It lasted for 30 seconds. She bit her tongue. She had urgency for a bowel movement after the event. \par \par She was compliant with levetiracetam 500 mg bid and clobazam 10 mg . She does not think she missed doses. \par She is not driving. She is working. She is able to walk to work.  She takes care of her three children. Her dose of clobazam was subsequently increased to 15 mg qhs. \par \par Other than that , she is doing okay. Her partner, who is present at bedside states that she appears more calm, less anxious. Her mood is better overall. \par \par She saw Dr. Tejada yesterday with plan for follow-up with him in one year to review the results. \par \par \par __________________________________________________\par \par \par Last seen in clinic on 3/12/21. Doing well. Feels more calm. Getting better sleep. Less angry outbursts. No interval clinical events concerning for seizure. Last seizure out of sleep witnessed by  was on 3/20/21. She was shaking from side to side. She was in a "vegetative state" for a few minutes afterwards. Felt confused afterwards. \par \par Sexually active. Now with IUD. Discussed risks of clobazam to a fetus. Patient and  are devout catholics and therefore are considering practicing abstinence. Thinks that current IUD is causing rash, BV. Considering taking it out. \par \par Tolerating medications well. No side-effects.  \par \par Reports high cervical anterior lump, not clear what this is. Painful to touch.\par \par Medications:\par  \par levetiracetam 500 mg bid \par clobazam 10 mg qhs \par ___________\par \par \par 3/22/21 \par Last seen in clinic on 2/12/21. Doing well. No clinical events concerning for seizure. Having a lot of stressful interactions with family members, particularly mother. She has a lot of anger when she comes home from work and the house is a mess. \par \par Otherwise tolerating the levetiracetam okay. She has [employer] seizure detection watch. Therefore her  can know if she is having seizures - she has nocturnal seizures and sometimes he doesn't wake up. \par They did not want to start clobazam due to concern for side-effects.  \par \par Met with Dr. Tejada. Will do surveillance imaging for right insular lesion, low suspicion for malignancy. \par ______________\par Dinorah Reyes is a 40-year-old right-handed lady with a past medical history of four clinical events concerning for seizure. She is presenting after hospital follow-up for a seizure. \par \par As per her report, her first seizure was in [place] in 2013. She was 33-years old at the time.  She felt tired and fell asleep. Woke up making sudden involuntary movements with her hands, R>L. She could not control the movements. She bit her tongue. No drooling or difficulty breathing. She was aware of what was happening but had no control. She fell asleep and awoke again and her tongue was bruised. She went to [facility] in [facility] and was told it may have been a panic attack. She was sent home. Prior to the event, she had no illness. \par \par Second event was out of sleep in 2019, six years later. Her  was sleeping. She awoke out of sleep and had involuntary movements, maybe of right hand greater than left with mouth movements. The event lasted 15-20 seconds. No loss of consciousness, no loss of urine, bit both sides of tongue. She was evaluated by [facility] and was told t his may be a panic attack. \par \par Third seizure was on 1/31/21 around 6 :20 AM.  awoke and witnessed whole body convulsions and foaming at the mouth. He turned her onto her right side more than left. The event lasted 20-30 seconds and then stopped. She was breathing heavily and had lost consciousness. She felt sore afterwards. \par \par The last seizure was on 2/10/21. She was brought to [facility] ED> She was postictal and unable to provide detailed history. As per chart notes,  ED called  who reported she was having generalized tonic clonic seizure with foaming and the mouth and bit her tongue. Seizure lasted a few minutes. Upon arrival to ED, BP was 165/82. O2 96%. She was afebrile and tachycardic at 110. She had tongue bite on right side with edema. No sign of infection. MRI brain showed T2 nonenhancing hyperintense focus within the anterior right insula, unclear etiology. She was discharged on  mg bid. \par \par \par Seizure risk factors: \par No history of brain infection, no family history of epilepsy, no history of meningitis, encephalitis. No history of learning problems. Met developmental milestones okay. No history of episode of head injury with loss of consciousness. Did have episode of high fever, was hallucinating and had visual hallucinations in setting of delirium in the past. 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

## 2021-05-27 NOTE — ASSESSMENT
[FreeTextEntry1] : Continue  mg bid (if continued seizures, will need to increase to 750 mg bid)  \par continue clobazam 10 mg qhs \par folic acid 400 mcg qdaily - incase unexpected pregnancy\par magnesium oxide prn difficulty sleeping\par vitamin b complex\par vitamin d \par return to clinic in 2 -3 months or sooner if seizure

## 2021-05-27 NOTE — DATA REVIEWED
[de-identified] : 2/10/21: MRI brain with and without contrast: T2 nonenhancing non masslike hyperintense focus, centered within the anterior right insula corresponding to findings seen on recently performed CT head, nonspecific. Possible diagnostic consideration include prominent focus of gliosis from prior ischemia versus postinfectious/post inflammatory process or post ictal focus. Low - grade glial neoplasm is considered unlikely given morphology and lack of mass effect and enhancement but not entirely excluded. Follow up suggested. No evidence of mesial temporal sclerosis.  [de-identified] : aEEG : 3/19-3/20/21: rare bilateral anterior temporal delta slowing suggestive of mild focal dysfunction in these regions. Low amplitude EEG. \par  [de-identified] : 2/10/21: Creatinine 0.8, AST ALT Alk phos within normal limits

## 2021-06-02 ENCOUNTER — NON-APPOINTMENT (OUTPATIENT)
Age: 41
End: 2021-06-02

## 2021-06-02 RX ORDER — LEVETIRACETAM 750 MG/1
750 TABLET, FILM COATED ORAL
Qty: 60 | Refills: 0 | Status: DISCONTINUED | COMMUNITY
Start: 2021-04-16 | End: 2021-06-02

## 2021-06-14 ENCOUNTER — APPOINTMENT (OUTPATIENT)
Dept: NEUROLOGY | Facility: CLINIC | Age: 41
End: 2021-06-14
Payer: MEDICAID

## 2021-06-14 PROCEDURE — 99215 OFFICE O/P EST HI 40 MIN: CPT

## 2021-06-16 ENCOUNTER — NON-APPOINTMENT (OUTPATIENT)
Age: 41
End: 2021-06-16

## 2021-06-16 NOTE — DISCUSSION/SUMMARY
[FreeTextEntry1] : Gertrude is a pleasant 40-year-old lady with history of epilepsy. First seizure was at age 33, with second seizure years later. 2 seizures more recently, with last seizure 3/21/21. Now on  mg bid and clobazam 10 mg qhs.  Saw Dr. Tejada for right insula lesion, doubt neoplastic, will have surveillance imaging follow-up within one year - 6/2022. \par \par Seizures appear to be focal motor seizures. Occur out of sleep with some seizures which were focal aware and others which appear to be focal to bilateral tonic clonic seizures with generalized shaking and foaming at the mouth. \par \makayla Has had ~ 5 lifetime seizures. Most recent seizure 5/2021 out of sleep. She may have had left-sided tingling, and then woke up her . This indicates she had an aura. He then witnessed convulsion with foaming at the mouth. Brief, < 30 seconds. This seizure was in the setting of compliance with AEDS.\par \par  Has right insular hyperintense focus, unclear etiology, may be post-infectious. Appears to have seizures with variable propagation. Possible she has prorogation anteriorly to the frontal lobe with hypermotor seizure and to more distributed networks for focal to bilateral tonic clonic seizure. Left-sided tingling, electric like sensation may point to insular involvement. \par \par Ambulatory EEG 3/2021 with rare bilateral anterior temporal delta slowing in drowsiness, no epileptiform potential or seizures. \par \par Allergy to Bactrim which may predispose to problem with lamotrigine. \par \par Continues to have significant difficulties with sleep. Cannot tolerate increasing clobazam. Has significant anxiety, ruminating thoughts as well. Scheduling an appointment with a therapist. Reports sometimes feeling bipolar with mood. \par \par Will try to add lamotrigine slowly (possible bactrim allergy) need to monitor closely for rash. Plan will be to eventually taper down levetiracetam or clobazam. \par \par \par \par \par \par  \par

## 2021-06-16 NOTE — PHYSICAL EXAM
[FreeTextEntry1] : Mental Status: AxOx3, speech: no dysarthria, slightly dysthymic affect \par CN: visual acuity, VFF, blink to confrontation \par III, IV, VI, PERRL, EOMI \par V sensation normal to light touch\par VII normal squint vs resistance, normal smile, face symmetric \par VIII: normal hearing \par IX, X normal gag, symmetric palate, uvula raises midline \par XI normal shrug versus resistance and lateralization of head versus resistance \par XII tongue symmetric, normal strength, no tremor or fasciculation \par Motor: full strength \par Sensory: normal to LT \par Reflexes \par Brachioradialis, biceps, triceps, patella and ankles 2+ \par Plantar flexor response bilaterally \par Coordination: no dysmetria on FNF\par Gait : normal balance and gait\par

## 2021-06-16 NOTE — ASSESSMENT
[FreeTextEntry1] : \par \par \par Lamotrigine: \par Week 1: take lamotrigine 25 mg every other day \par Week 2: take lamotrigine 25 mg daily \par Week 3: lamotrigine 25 mg bid \par Week 4: take lamotrigine 50 mg in the morning and 25 mg at night \par Week 5: take lamotrigine 50 mg in the morning and 50 mg at night \par \par Stop medicine immediately if you get a rash - if the rash spreads, go the emergency room \par __________________________________________________\par \par continue keppra 500 mg twice a day \par continue clobazam 10 mg at night \par continue vitamin d - 45711 units q week x 6 weeks \par continue melatonin 5 mg po qd \par continue magnesium oxide 400 mg po qhs \par continue folic acid \par \par May need to consider EMU if continued seizures - need to characterize events  \par \par \par Return to clinic in 1 month

## 2021-06-16 NOTE — HISTORY OF PRESENT ILLNESS
[FreeTextEntry1] : Presenting for follow-up. Last seen in clinic on 5/25/21. Reports racing thoughts, intense anxiety. This keeps her up at night. She reports that taking clobazam 15 mg qd makes her feel worse so she has decreased back down \par to 10 mg qdaily. She had difficulty concentrating on a higher dose of clobazam. She is taking levetiracetam 500 mg twice a day. She also takes vitamin d and magnesium to help her sleep. She also takes valerian root. \par \par She requested a therapist from work because her mood swings, she feels like she is bipolar, are causing tension with her  and family. She enjoys working. She likes to keep busy. Last seizure - \par convulsive, witnessed seizure out of sleep, witnessed by  was 5/22/1. \par \par She no longer has IUD. She is aware of risk of birth defects with clobazam. Her  and \par her are refraining from sexual activity. \par \par __________________\par 5/25/21 \par Doing okay. Had breakthrough seizure in setting of compliance with medications on 5/22/21. Gertrude has little memory but thinks she may have felt electricity on the left side of her body from her head to her toes.  She then made a loud sound and woke up her partner who was sleeping next to her. He saw shaking and foaming at the mouth. It lasted for 30 seconds. She bit her tongue. She had urgency for a bowel movement after the event. \par \par She was compliant with levetiracetam 500 mg bid and clobazam 10 mg . She does not think she missed doses. \par She is not driving. She is working. She is able to walk to work.  She takes care of her three children. Her dose of clobazam was subsequently increased to 15 mg qhs. \par \par Other than that , she is doing okay. Her partner, who is present at bedside states that she appears more calm, less anxious. Her mood is better overall. \par \par She saw Dr. Tejada yesterday with plan for follow-up with him in one year to review the results. \par _________________________________________________\par Last seen in clinic on 3/12/21. Doing well. Feels more calm. Getting better sleep. Less angry outbursts. No interval clinical events concerning for seizure. Last seizure out of sleep witnessed by  was on 3/20/21. She was shaking from side to side. She was in a "vegetative state" for a few minutes afterwards. Felt confused afterwards. \par \par Sexually active. Now with IUD. Discussed risks of clobazam to a fetus. Patient and  are devout catholics and therefore are considering practicing abstinence. Thinks that current IUD is causing rash, BV. Considering taking it out. \par \par Tolerating medications well. No side-effects.  \par \par Reports high cervical anterior lump, not clear what this is. Painful to touch.\par \par Medications:\par  \par levetiracetam 500 mg bid \par clobazam 10 mg qhs \par ___________\par \par \par 3/22/21 \par Last seen in clinic on 2/12/21. Doing well. No clinical events concerning for seizure. Having a lot of stressful interactions with family members, particularly mother. She has a lot of anger when she comes home from work and the house is a mess. \par \par Otherwise tolerating the levetiracetam okay. She has [employer] seizure detection watch. Therefore her  can know if she is having seizures - she has nocturnal seizures and sometimes he doesn't wake up. \par They did not want to start clobazam due to concern for side-effects.  \par \par Met with Dr. Tejada. Will do surveillance imaging for right insular lesion, low suspicion for malignancy. \par ______________\par Dinorah Reyes is a 40-year-old right-handed lady with a past medical history of four clinical events concerning for seizure. She is presenting after hospital follow-up for a seizure. \par \par As per her report, her first seizure was in [place] in 2013. She was 33-years old at the time.  She felt tired and fell asleep. Woke up making sudden involuntary movements with her hands, R>L. She could not control the movements. She bit her tongue. No drooling or difficulty breathing. She was aware of what was happening but had no control. She fell asleep and awoke again and her tongue was bruised. She went to [facility] in [facility] and was told it may have been a panic attack. She was sent home. Prior to the event, she had no illness. \par \par Second event was out of sleep in 2019, six years later. Her  was sleeping. She awoke out of sleep and had involuntary movements, maybe of right hand greater than left with mouth movements. The event lasted 15-20 seconds. No loss of consciousness, no loss of urine, bit both sides of tongue. She was evaluated by [facility] and was told t his may be a panic attack. \par \par Third seizure was on 1/31/21 around 6 :20 AM.  awoke and witnessed whole body convulsions and foaming at the mouth. He turned her onto her right side more than left. The event lasted 20-30 seconds and then stopped. She was breathing heavily and had lost consciousness. She felt sore afterwards. \par \par The last seizure was on 2/10/21. She was brought to [facility]> She was postictal and unable to provide detailed history. As per chart notes,  ED called  who reported she was having generalized tonic clonic seizure with foaming and the mouth and bit her tongue. Seizure lasted a few minutes. Upon arrival to ED, BP was 165/82. O2 96%. She was afebrile and tachycardic at 110. She had tongue bite on right side with edema. No sign of infection. MRI brain showed T2 nonenhancing hyperintense focus within the anterior right insula, unclear etiology. She was discharged on  mg bid. \par \par \par Seizure risk factors: \par No history of brain infection, no family history of epilepsy, no history of meningitis, encephalitis. No history of learning problems. Met developmental milestones okay. No history of episode of head injury with loss of consciousness. Did have episode of high fever, was hallucinating and had visual hallucinations in setting of delirium in the past. 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

## 2021-06-16 NOTE — DATA REVIEWED
[de-identified] : 2/10/21: MRI brain with and without contrast: T2 nonenhancing non masslike hyperintense focus, centered within the anterior right insula corresponding to findings seen on recently performed CT head, nonspecific. Possible diagnostic consideration include prominent focus of gliosis from prior ischemia versus postinfectious/post inflammatory process or post ictal focus. Low - grade glial neoplasm is considered unlikely given morphology and lack of mass effect and enhancement but not entirely excluded. Follow up suggested. No evidence of mesial temporal sclerosis.  [de-identified] : aEEG : 3/19-3/20/21: rare bilateral anterior temporal delta slowing suggestive of mild focal dysfunction in these regions. Low amplitude EEG. \par  [de-identified] : 2/10/21: Creatinine 0.8, AST ALT Alk phos within normal limits \par 6/2021: LEV level 6.5, vitamin D level 25.3 low , clobazam level 250,

## 2021-06-22 ENCOUNTER — NON-APPOINTMENT (OUTPATIENT)
Age: 41
End: 2021-06-22

## 2021-07-02 ENCOUNTER — APPOINTMENT (OUTPATIENT)
Dept: OBGYN | Facility: CLINIC | Age: 41
End: 2021-07-02
Payer: MEDICAID

## 2021-07-02 VITALS
TEMPERATURE: 98 F | SYSTOLIC BLOOD PRESSURE: 130 MMHG | HEIGHT: 62 IN | WEIGHT: 158 LBS | BODY MASS INDEX: 29.08 KG/M2 | DIASTOLIC BLOOD PRESSURE: 80 MMHG

## 2021-07-02 PROCEDURE — 99213 OFFICE O/P EST LOW 20 MIN: CPT

## 2021-07-02 RX ORDER — CHLORHEXIDINE GLUCONATE 4 %
5 LIQUID (ML) TOPICAL DAILY
Qty: 30 | Refills: 0 | Status: DISCONTINUED | COMMUNITY
Start: 2021-02-19 | End: 2021-07-02

## 2021-07-02 RX ORDER — HYDROCORTISONE 25 MG/G
2.5 CREAM TOPICAL
Qty: 28 | Refills: 0 | Status: DISCONTINUED | COMMUNITY
Start: 2021-01-26

## 2021-07-02 RX ORDER — NAPROXEN 500 MG/1
500 TABLET ORAL
Qty: 20 | Refills: 0 | Status: DISCONTINUED | COMMUNITY
Start: 2021-02-05

## 2021-07-02 RX ORDER — CLOBAZAM 10 MG/1
10 TABLET ORAL
Qty: 30 | Refills: 0 | Status: DISCONTINUED | COMMUNITY
Start: 2021-04-20 | End: 2021-07-02

## 2021-07-02 RX ORDER — MIDAZOLAM 5 MG/.1ML
5 SPRAY NASAL
Qty: 1 | Refills: 5 | Status: DISCONTINUED | COMMUNITY
Start: 2021-02-14 | End: 2021-07-02

## 2021-07-02 RX ORDER — CYCLOBENZAPRINE HYDROCHLORIDE 5 MG/1
5 TABLET, FILM COATED ORAL
Qty: 10 | Refills: 0 | Status: DISCONTINUED | COMMUNITY
Start: 2021-02-05

## 2021-07-02 RX ORDER — CEFUROXIME AXETIL 500 MG/1
500 TABLET ORAL
Qty: 14 | Refills: 0 | Status: DISCONTINUED | COMMUNITY
Start: 2021-02-10

## 2021-07-02 RX ORDER — CLOBAZAM 10 MG/1
10 TABLET ORAL
Qty: 30 | Refills: 0 | Status: DISCONTINUED | COMMUNITY
Start: 2021-05-17 | End: 2021-07-02

## 2021-07-02 RX ORDER — FLUTICASONE PROPIONATE 50 UG/1
50 SPRAY, METERED NASAL
Qty: 16 | Refills: 0 | Status: DISCONTINUED | COMMUNITY
Start: 2021-06-13

## 2021-07-02 RX ORDER — CHOLECALCIFEROL (VITAMIN D3) 25 MCG
25 MCG TABLET ORAL DAILY
Qty: 30 | Refills: 5 | Status: DISCONTINUED | COMMUNITY
Start: 2021-04-20 | End: 2021-07-02

## 2021-07-02 NOTE — PLAN
[FreeTextEntry1] : -Labs sent as ordered.\par \par -Pt had numerous questions which were all addressed and discussed in detail.\par \par -Further management of symptoms after results are received.\par \par -F/u in one week.\par \par -I have spent 20 minutes on this encounter.\par

## 2021-07-02 NOTE — HISTORY OF PRESENT ILLNESS
[FreeTextEntry1] : 39 y/o , reports has not been sexually active for two months presents for recurrent vulvar burning that comes and goes. Especially painful in that area is she sprays water on it or if she urinates. The areas that burn are always the same.\par \par No vaginal discharge, odor or itching.\par \par No pelvic pain or dysuria.\par \par

## 2021-07-02 NOTE — HISTORY OF PRESENT ILLNESS
[FreeTextEntry1] : 41 y/o , reports has not been sexually active for two months presents for recurrent vulvar burning that comes and goes. Especially painful in that area is she sprays water on it or if she urinates. The areas that burn are always the same.\par \par No vaginal discharge, odor or itching.\par \par No pelvic pain or dysuria.\par \par

## 2021-07-02 NOTE — REVIEW OF SYSTEMS
[Genital Rash/Irritation] : genital rash/irritation [Negative] : Heme/Lymph [Dysuria] : no dysuria [Pelvic pain] : no pelvic pain

## 2021-07-02 NOTE — PHYSICAL EXAM
[Appropriately responsive] : appropriately responsive [Alert] : alert [No Acute Distress] : no acute distress [Oriented x3] : oriented x3 [Pink Rugae] : pink rugae [No Bleeding] : There was no active vaginal bleeding [Normal] : normal [FreeTextEntry1] : There a three areas of erythema, without exudate on top of clitoris, right labia minora and left labia minora.

## 2021-07-07 ENCOUNTER — APPOINTMENT (OUTPATIENT)
Dept: OBGYN | Facility: CLINIC | Age: 41
End: 2021-07-07

## 2021-07-07 ENCOUNTER — RX RENEWAL (OUTPATIENT)
Age: 41
End: 2021-07-07

## 2021-07-07 LAB
HERPES SIMPLEX 1 DNA: NOT DETECTED
HERPES SIMPLEX 2 DNA: DETECTED
HERPES SIMPLEX SPECIMEN SOURCE: NORMAL
HSV 1+2 IGG SER IA-IMP: POSITIVE
HSV 1+2 IGG SER IA-IMP: POSITIVE
HSV1 IGG SER QL: 31.2 INDEX
HSV1 IGM SER QL: NORMAL TITER
HSV2 AB FLD-ACNC: NORMAL TITER
HSV2 IGG SER QL: 8.85 INDEX

## 2021-07-15 ENCOUNTER — RX RENEWAL (OUTPATIENT)
Age: 41
End: 2021-07-15

## 2021-07-19 ENCOUNTER — APPOINTMENT (OUTPATIENT)
Dept: NEUROLOGY | Facility: CLINIC | Age: 41
End: 2021-07-19
Payer: MEDICAID

## 2021-07-19 VITALS
BODY MASS INDEX: 28.52 KG/M2 | DIASTOLIC BLOOD PRESSURE: 81 MMHG | WEIGHT: 155 LBS | HEIGHT: 62 IN | TEMPERATURE: 97.4 F | HEART RATE: 57 BPM | SYSTOLIC BLOOD PRESSURE: 131 MMHG

## 2021-07-19 PROCEDURE — 99214 OFFICE O/P EST MOD 30 MIN: CPT

## 2021-07-30 ENCOUNTER — NON-APPOINTMENT (OUTPATIENT)
Age: 41
End: 2021-07-30

## 2021-08-03 ENCOUNTER — NON-APPOINTMENT (OUTPATIENT)
Age: 41
End: 2021-08-03

## 2021-08-04 ENCOUNTER — RX RENEWAL (OUTPATIENT)
Age: 41
End: 2021-08-04

## 2021-08-08 NOTE — ASSESSMENT
[FreeTextEntry1] : Epilepsy: \par \par continue levetiracetam 1000 mg bid \par _____________________________________\par Continue up-titration of lamotrigine \par Week 1: lamotrigine 75 mg in the morning and 50 mg at night \par Week 2: lamotrigine 75 mg in the morning and 75 mg at night \par Week 3: lamotrigine 100 mg in the morning and 75 mg at night \par Week 4: lamotrigine 100 mg in the morning and 100 mg at night \par \par _Take clobazam 10 mg at night \par \par Take all medications about 12 hours apart \par \par Take klonopin 1 mg at night as needed for insomnia. Can take it in the morning as well \par \par follow-up with a therapist \par \par Return to clinic in one month\par \par If continued seizures, will need referral to [facility] for EMU. Would like second opinion from Dr. Portillo at this time.  \par

## 2021-08-08 NOTE — PHYSICAL EXAM
[FreeTextEntry1] : Mental Status: AxOx3, speech: no dysarthria, slightly dysthymic affect, tearful affect \par CN: visual acuity, VFF, blink to confrontation \par III, IV, VI, PERRL, EOMI \par V sensation normal to light touch\par VII normal squint vs resistance, normal smile, face symmetric \par VIII: normal hearing \par IX, X normal gag, symmetric palate, uvula raises midline \par XI normal shrug versus resistance and lateralization of head versus resistance \par XII tongue symmetric, normal strength, no tremor or fasciculation \par Motor: full strength \par Sensory: normal to LT \par Reflexes \par Brachioradialis, biceps, triceps, patella and ankles 2+ \par Plantar flexor response bilaterally \par Coordination: no dysmetria on FNF\par Gait : normal balance and gait\par

## 2021-08-08 NOTE — HISTORY OF PRESENT ILLNESS
[FreeTextEntry1] : Presenting to clinic with her  for a follow-up appointment. Last seen in clinic on June 14, 2021. She had another seizure on June 16th, 2021. \par She had another seizure requiring an ED visit on Sunday 7/18/21. Her  witnessed the event. Seizure occurred at 5:55 am. She almost fell off the bad but he was able to catch her. It lasted ~40 seconds with short post-ictal period. She had a sensatoin in right leg that spread. \par \par She reports pain in her left arm but not chest pain. She reports compliance with AEDs. \par \par She reprots feeling very frustrated regarding breakthrough seizures. She has a lot of stresses including other medical problems. Feels overwhelmed. Told her she may need to take time off work but she was concerned regarding finances. Discussed that if she continues to have seizures, I would like her to get a second opinion at [facility] and be admitted there for extended EMU monitoring. IN the meantime, will continue to go up on lamotrigine wit plan to taper clobazam or levetiracetam when she is at a therapeutic level of lamotrigine. She was taking second dose of lamotrigine in the early evening. Advised her to take it at night. \par \par ______________\par \par Presenting for follow-up. Last seen in clinic on 5/25/21. Reports racing thoughts, intense anxiety. This keeps her up at night. She reports that taking clobazam 15 mg qd makes her feel worse so she has decreased back down \par to 10 mg qdaily. She had difficulty concentrating on a higher dose of clobazam. She is taking levetiracetam 500 mg twice a day. She also takes vitamin d and magnesium to help her sleep. She also takes valerian root. \par \par She requested a therapist from work because her mood swings, she feels like she is bipolar, are causing tension with her  and family. She enjoys working. She likes to keep busy. Last seizure - \par convulsive, witnessed seizure out of sleep, witnessed by  was 5/22/1. \par \par She no longer has IUD. She is aware of risk of birth defects with clobazam. Her  and \par her are refraining from sexual activity. \par \par __________________\par 5/25/21 \par Doing okay. Had breakthrough seizure in setting of compliance with medications on 5/22/21. Gertrude has little memory but thinks she may have felt electricity on the left side of her body from her head to her toes.  She then made a loud sound and woke up her partner who was sleeping next to her. He saw shaking and foaming at the mouth. It lasted for 30 seconds. She bit her tongue. She had urgency for a bowel movement after the event. \par \par She was compliant with levetiracetam 500 mg bid and clobazam 10 mg . She does not think she missed doses. \par She is not driving. She is working. She is able to walk to work.  She takes care of her three children. Her dose of clobazam was subsequently increased to 15 mg qhs. \par \par Other than that , she is doing okay. Her partner, who is present at bedside states that she appears more calm, less anxious. Her mood is better overall. \par \par She saw Dr. Tejada yesterday with plan for follow-up with him in one year to review the results. \par _________________________________________________\par Last seen in clinic on 3/12/21. Doing well. Feels more calm. Getting better sleep. Less angry outbursts. No interval clinical events concerning for seizure. Last seizure out of sleep witnessed by  was on 3/20/21. She was shaking from side to side. She was in a "vegetative state" for a few minutes afterwards. Felt confused afterwards. \par \par Sexually active. Now with IUD. Discussed risks of clobazam to a fetus. Patient and  are devout catholics and therefore are considering practicing abstinence. Thinks that current IUD is causing rash, BV. Considering taking it out. \par \par Tolerating medications well. No side-effects.  \par \par Reports high cervical anterior lump, not clear what this is. Painful to touch.\par \par Medications:\par  \par levetiracetam 500 mg bid \par clobazam 10 mg qhs \par ___________\par \par \par 3/22/21 \par Last seen in clinic on 2/12/21. Doing well. No clinical events concerning for seizure. Having a lot of stressful interactions with family members, particularly mother. She has a lot of anger when she comes home from work and the house is a mess. \par \par Otherwise tolerating the levetiracetam okay. She has [employer] seizure detection watch. Therefore her  can know if she is having seizures - she has nocturnal seizures and sometimes he doesn't wake up. \par They did not want to start clobazam due to concern for side-effects.  \par \par Met with Dr. Tejada. Will do surveillance imaging for right insular lesion, low suspicion for malignancy. \par ______________\par Dinorah Reyes is a 40-year-old right-handed lady with a past medical history of four clinical events concerning for seizure. She is presenting after hospital follow-up for a seizure. \par \par As per her report, her first seizure was in [place] in 2013. She was 33-years old at the time.  She felt tired and fell asleep. Woke up making sudden involuntary movements with her hands, R>L. She could not control the movements. She bit her tongue. No drooling or difficulty breathing. She was aware of what was happening but had no control. She fell asleep and awoke again and her tongue was bruised. She went to [facility] in [facility] and was told it may have been a panic attack. She was sent home. Prior to the event, she had no illness. \par \par Second event was out of sleep in 2019, six years later. Her  was sleeping. She awoke out of sleep and had involuntary movements, maybe of right hand greater than left with mouth movements. The event lasted 15-20 seconds. No loss of consciousness, no loss of urine, bit both sides of tongue. She was evaluated by [facility] and was told t his may be a panic attack. \par \par Third seizure was on 1/31/21 around 6 :20 AM.  awoke and witnessed whole body convulsions and foaming at the mouth. He turned her onto her right side more than left. The event lasted 20-30 seconds and then stopped. She was breathing heavily and had lost consciousness. She felt sore afterwards. \par \par The last seizure was on 2/10/21. She was brought to [facility] ED> She was postictal and unable to provide detailed history. As per chart notes,  ED called  who reported she was having generalized tonic clonic seizure with foaming and the mouth and bit her tongue. Seizure lasted a few minutes. Upon arrival to ED, BP was 165/82. O2 96%. She was afebrile and tachycardic at 110. She had tongue bite on right side with edema. No sign of infection. MRI brain showed T2 nonenhancing hyperintense focus within the anterior right insula, unclear etiology. She was discharged on  mg bid. \par \par \par Seizure risk factors: \par No history of brain infection, no family history of epilepsy, no history of meningitis, encephalitis. No history of learning problems. Met developmental milestones okay. No history of episode of head injury with loss of consciousness. Did have episode of high fever, was hallucinating and had visual hallucinations in setting of delirium in the past. \par \par Past Medical History:\par Anemia\par \par Surgical History \par 1/2015: ovarian cyst, torsion\par \par Allergies: Bactrim \par \par Social History: \par Lives with kids and  in [place], children are 5, 12, and 15 years of age \par Caregiver \par 10th grade education\par No drugs, rare alcohol \par \par Family History: \par Mother – 77: htn, DM \par Father – smoker \par Brother – open heart surgery \par

## 2021-08-08 NOTE — DISCUSSION/SUMMARY
[FreeTextEntry1] : Gertrude is a pleasant 40-year-old lady with history of epilepsy. First seizure was at age 33, with second seizure years later. 2 seizures more recently, with last seizure 3/21/21. Now on  mg bid and clobazam 10 mg qhs. Saw Dr. Tejada for right insula lesion, doubt neoplastic, will have surveillance imaging follow-up within one year - 6/2022. \par \par Seizures appear to be focal motor seizures. Occur out of sleep with some seizures which were focal aware and others which appear to be focal to bilateral tonic clonic seizures with generalized shaking and foaming at the mouth. \par \par Has had ~ 7 lifetime seizures. Most recent seizure 7/18/21 out of sleep. Prior to seizure, she may get a rush of tingling (insular) - this is followed by a generalized brief seizure with no significant post-ictal confusion. Seizures are occurring despite increase in medication. Has been started on klonopin and seizures happen despite this. \par \par  Has right insular hyperintense focus, unclear etiology, may be post-infectious. Appears to have seizures with variable propagation. Possible she has prorogation anteriorly to the frontal lobe with hypermotor seizure and to more distributed networks for focal to bilateral tonic clonic seizure. Left-sided tingling, electric like sensation may point to insular involvement. \par \par Ambulatory EEG 3/2021 with rare bilateral anterior temporal delta slowing in drowsiness, no epileptiform potential or seizures.  \par \par Continues to have significant difficulties with sleep. Cannot tolerate increasing clobazam. Has significant anxiety, ruminating thoughts as well. Scheduling an appointment with a therapist. Reports sometimes feeling bipolar with mood. \par \par With convulsive seizures, she is at risk for sudep. She is becoming refractory to medications. If continued seizures despite maximal medical therapy, she will need admission to [facility] for extended monitoring. She may be surgical candidate. \par \par \par \par

## 2021-08-08 NOTE — DATA REVIEWED
[de-identified] : 2/10/21: MRI brain with and without contrast: T2 nonenhancing non masslike hyperintense focus, centered within the anterior right insula corresponding to findings seen on recently performed CT head, nonspecific. Possible diagnostic consideration include prominent focus of gliosis from prior ischemia versus postinfectious/post inflammatory process or post ictal focus. Low - grade glial neoplasm is considered unlikely given morphology and lack of mass effect and enhancement but not entirely excluded. Follow up suggested. No evidence of mesial temporal sclerosis.  [de-identified] : aEEG : 3/19-3/20/21: rare bilateral anterior temporal delta slowing suggestive of mild focal dysfunction in these regions. Low amplitude EEG. \par  [de-identified] : 2/10/21: Creatinine 0.8, AST ALT Alk phos within normal limits \par 6/2021: LEV level 6.5, vitamin D level 25.3 low , clobazam level 250, \par 7/18/21: lamotrigine level 3.8 (2-20), levetiracetam level 37.1 \par 7/18/21: creatinine level 0.7

## 2021-08-18 LAB
C TRACH RRNA SPEC QL NAA+PROBE: NOT DETECTED
CANDIDA VAG CYTO: NOT DETECTED
G VAGINALIS+PREV SP MTYP VAG QL MICRO: DETECTED
N GONORRHOEA RRNA SPEC QL NAA+PROBE: NOT DETECTED
SOURCE AMPLIFICATION: NORMAL
T VAGINALIS VAG QL WET PREP: NOT DETECTED

## 2021-08-18 RX ORDER — METRONIDAZOLE 7.5 MG/G
0.75 GEL VAGINAL
Qty: 1 | Refills: 0 | Status: COMPLETED | COMMUNITY
Start: 2021-03-29 | End: 2021-05-07

## 2021-08-19 ENCOUNTER — NON-APPOINTMENT (OUTPATIENT)
Age: 41
End: 2021-08-19

## 2021-08-21 ENCOUNTER — RESULT REVIEW (OUTPATIENT)
Age: 41
End: 2021-08-21

## 2021-08-23 ENCOUNTER — FORM ENCOUNTER (OUTPATIENT)
Age: 41
End: 2021-08-23

## 2021-08-24 ENCOUNTER — APPOINTMENT (OUTPATIENT)
Dept: NEUROLOGY | Facility: CLINIC | Age: 41
End: 2021-08-24

## 2021-08-24 ENCOUNTER — INPATIENT (INPATIENT)
Facility: HOSPITAL | Age: 41
LOS: 6 days | Discharge: HOME | DRG: 101 | End: 2021-08-31
Attending: PSYCHIATRY & NEUROLOGY | Admitting: PSYCHIATRY & NEUROLOGY
Payer: COMMERCIAL

## 2021-08-24 VITALS
DIASTOLIC BLOOD PRESSURE: 65 MMHG | HEIGHT: 62 IN | RESPIRATION RATE: 16 BRPM | WEIGHT: 150.13 LBS | SYSTOLIC BLOOD PRESSURE: 122 MMHG | HEART RATE: 60 BPM | OXYGEN SATURATION: 97 % | TEMPERATURE: 98 F

## 2021-08-24 DIAGNOSIS — N83.519 TORSION OF OVARY AND OVARIAN PEDICLE, UNSPECIFIED SIDE: Chronic | ICD-10-CM

## 2021-08-24 LAB
ALBUMIN SERPL ELPH-MCNC: 4.8 G/DL — SIGNIFICANT CHANGE UP (ref 3.3–5)
ALP SERPL-CCNC: 52 U/L — SIGNIFICANT CHANGE UP (ref 40–120)
ALT FLD-CCNC: 15 U/L — SIGNIFICANT CHANGE UP (ref 10–45)
ANION GAP SERPL CALC-SCNC: 7 MMOL/L — SIGNIFICANT CHANGE UP (ref 5–17)
AST SERPL-CCNC: 18 U/L — SIGNIFICANT CHANGE UP (ref 10–40)
BILIRUB SERPL-MCNC: 0.3 MG/DL — SIGNIFICANT CHANGE UP (ref 0.2–1.2)
BUN SERPL-MCNC: 14 MG/DL — SIGNIFICANT CHANGE UP (ref 7–23)
CALCIUM SERPL-MCNC: 9.8 MG/DL — SIGNIFICANT CHANGE UP (ref 8.4–10.5)
CHLORIDE SERPL-SCNC: 104 MMOL/L — SIGNIFICANT CHANGE UP (ref 96–108)
CHOLEST SERPL-MCNC: 154 MG/DL — SIGNIFICANT CHANGE UP
CO2 SERPL-SCNC: 25 MMOL/L — SIGNIFICANT CHANGE UP (ref 22–31)
CREAT SERPL-MCNC: 0.82 MG/DL — SIGNIFICANT CHANGE UP (ref 0.5–1.3)
ETHANOL SERPL-MCNC: <10 MG/DL — SIGNIFICANT CHANGE UP (ref 0–10)
GLUCOSE SERPL-MCNC: 100 MG/DL — HIGH (ref 70–99)
HCG SERPL-ACNC: <0 MIU/ML — SIGNIFICANT CHANGE UP
HCT VFR BLD CALC: 36.3 % — SIGNIFICANT CHANGE UP (ref 34.5–45)
HDLC SERPL-MCNC: 87 MG/DL — SIGNIFICANT CHANGE UP
HGB BLD-MCNC: 11.3 G/DL — LOW (ref 11.5–15.5)
LACTATE SERPL-SCNC: 0.6 MMOL/L — SIGNIFICANT CHANGE UP (ref 0.5–2)
LIPID PNL WITH DIRECT LDL SERPL: 58 MG/DL — SIGNIFICANT CHANGE UP
MAGNESIUM SERPL-MCNC: 2 MG/DL — SIGNIFICANT CHANGE UP (ref 1.6–2.6)
MCHC RBC-ENTMCNC: 25.8 PG — LOW (ref 27–34)
MCHC RBC-ENTMCNC: 31.1 GM/DL — LOW (ref 32–36)
MCV RBC AUTO: 82.9 FL — SIGNIFICANT CHANGE UP (ref 80–100)
NON HDL CHOLESTEROL: 67 MG/DL — SIGNIFICANT CHANGE UP
NRBC # BLD: 0 /100 WBCS — SIGNIFICANT CHANGE UP (ref 0–0)
PCP SPEC-MCNC: SIGNIFICANT CHANGE UP
PHOSPHATE SERPL-MCNC: 4 MG/DL — SIGNIFICANT CHANGE UP (ref 2.5–4.5)
PLATELET # BLD AUTO: 267 K/UL — SIGNIFICANT CHANGE UP (ref 150–400)
POTASSIUM SERPL-MCNC: 4 MMOL/L — SIGNIFICANT CHANGE UP (ref 3.5–5.3)
POTASSIUM SERPL-SCNC: 4 MMOL/L — SIGNIFICANT CHANGE UP (ref 3.5–5.3)
PROT SERPL-MCNC: 7.5 G/DL — SIGNIFICANT CHANGE UP (ref 6–8.3)
RBC # BLD: 4.38 M/UL — SIGNIFICANT CHANGE UP (ref 3.8–5.2)
RBC # FLD: 16.2 % — HIGH (ref 10.3–14.5)
SODIUM SERPL-SCNC: 136 MMOL/L — SIGNIFICANT CHANGE UP (ref 135–145)
TRIGL SERPL-MCNC: 45 MG/DL — SIGNIFICANT CHANGE UP
WBC # BLD: 5.74 K/UL — SIGNIFICANT CHANGE UP (ref 3.8–10.5)
WBC # FLD AUTO: 5.74 K/UL — SIGNIFICANT CHANGE UP (ref 3.8–10.5)

## 2021-08-24 PROCEDURE — 95718 EEG PHYS/QHP 2-12 HR W/VEEG: CPT

## 2021-08-24 RX ORDER — LANOLIN ALCOHOL/MO/W.PET/CERES
5 CREAM (GRAM) TOPICAL AT BEDTIME
Refills: 0 | Status: DISCONTINUED | OUTPATIENT
Start: 2021-08-24 | End: 2021-08-31

## 2021-08-24 RX ORDER — LEVETIRACETAM 250 MG/1
1 TABLET, FILM COATED ORAL
Qty: 0 | Refills: 0 | DISCHARGE

## 2021-08-24 RX ORDER — LEVETIRACETAM 250 MG/1
500 TABLET, FILM COATED ORAL
Refills: 0 | Status: COMPLETED | OUTPATIENT
Start: 2021-08-24 | End: 2021-08-25

## 2021-08-24 RX ORDER — LANOLIN ALCOHOL/MO/W.PET/CERES
1 CREAM (GRAM) TOPICAL
Qty: 0 | Refills: 0 | DISCHARGE

## 2021-08-24 RX ORDER — LAMOTRIGINE 25 MG/1
100 TABLET, ORALLY DISINTEGRATING ORAL EVERY 12 HOURS
Refills: 0 | Status: DISCONTINUED | OUTPATIENT
Start: 2021-08-24 | End: 2021-08-26

## 2021-08-24 RX ADMIN — LAMOTRIGINE 100 MILLIGRAM(S): 25 TABLET, ORALLY DISINTEGRATING ORAL at 22:29

## 2021-08-24 RX ADMIN — LEVETIRACETAM 500 MILLIGRAM(S): 250 TABLET, FILM COATED ORAL at 22:29

## 2021-08-24 RX ADMIN — Medication 5 MILLIGRAM(S): at 22:29

## 2021-08-24 NOTE — H&P ADULT - ATTENDING COMMENTS
Patient seen by me on 8/25/2021.    41-year-old woman, followed by Dr. Alexander, admitted for presurgical evaluation and medication titration.  Onfi held on admission, last dose of Keppra this morning, remains on full home dose of Lamictal for now.  EEG normal so far.  HV/photic today, likely reduce Lamictal dose tomorrow. Patient seen by me on 8/25/2021.    41-year-old woman, followed by Dr. Alexander, admitted for presurgical evaluation and medication titration.  Onfi held on admission, last dose of Keppra this morning, remains on full home dose of Lamictal for now.  EEG normal so far.  MRI independently reviewed by me, notable for R insular lesion.  HV/photic today, likely reduce Lamictal dose tomorrow.

## 2021-08-24 NOTE — H&P ADULT - NSHPPHYSICALEXAM_GEN_ALL_CORE
General:  Constitutional:  Sitting comfortably in NAD.  Psychiatric: calm, normal affect, no overt anxiety or internal preoccupation  Ears, Nose, Throat: no abnormalities, mucus membranes moist  Neck: supple, no lymphadenopathy or nodules palpable   Cardiovascular: regular rate and rhythm, normal S1/S2, no murmurs   Chest: Clear to bases. 	Abdomen: soft, non-tender, no hepatosplenomegaly   Extremities: no edema, clubbing or cyanosis  Skin: no rash or neurocutaneous signs     Cognitive:  Orientation, language, memory and knowledge screens intact.  Registration: 	4/4		Serial 7’s: normal		Recall 4/4    Cranial Nerves:  II: Full to confrontation; disc margins sharp. III/IV/VI: PERRL EOMF No nystagmus  V1V2V3: Symmetric, VII: Face appears symmetric VIII: Normal to screening, IX/X: Palate Elevates Symmetrical  XI: Trapezius Symmetric  XII: Tongue midline  Motor:  Power: 5/5 throughout, tone: normal x 4 limbs, no tremor   Sensation:  Intact to light touch. Intact to pinprick/temperature and vibration.  Coordination/Gait:  Finger-nose-finger intact, normal rapid-alternating movements.  Fine motor normal with normal rapid finger taps and heel-toe tapping   narrow based gait, tandem forward and back ok  hops well on both feet, heel and toe walking normal   Reflexes:  DTR: 2+ symmetric all 4 limbs, no clonus  Plantar responses: Down bilaterally

## 2021-08-24 NOTE — H&P ADULT - NSHPREVIEWOFSYSTEMS_GEN_ALL_CORE
Constitutional: as per HPI  Eyes: No eye pain or discharge  ENMT:  No difficulty hearing; No sinus or throat pain  Neck: No pain or stiffness  Respiratory: No cough, wheezing, chills or hemoptysis  Cardiovascular: No chest pain, palpitations, shortness of breath, dyspnea on exertion  Gastrointestinal: No abdominal pain, nausea, vomiting or hematemesis; No diarrhea or constipation.   Genitourinary: No dysuria, frequency, hematuria or incontinence  Neurological: As per HPI  Skin: No rashes or lesions   Endocrine: No heat or cold intolerance; No hair loss  Musculoskeletal: No joint pain or swelling  Psychiatric: No depression, anxiety, mood swings  Heme/Lymph: No easy bruising or bleeding gums

## 2021-08-24 NOTE — H&P ADULT - NSICDXFAMILYHX_GEN_ALL_CORE_FT
FAMILY HISTORY:  Mother  Still living? Yes, Estimated age: 71-80  FH: hypertension, Age at diagnosis: Age Unknown

## 2021-08-24 NOTE — H&P ADULT - NSHPLABSRESULTS_GEN_ALL_CORE
MRI: 2/10/2021: T2 non enhancing lesion non masslike hyperintense focus, [id number] within the anterior right insula corresponding to findings seen on recently performed CT head.   -320/2021: rare bilateral anterior temporal delta slowing suggestive of mild focal dysfunction in these regions . low amplitude EEG

## 2021-08-24 NOTE — H&P ADULT - ASSESSMENT
This 41y old female, has a history of epilepsy with last seizure in july 19, 2021. She is on multiple AEDs. Her MRI showed Rt insular lesion   She is admitted to the EMU to capture one of her seizure to characterize the seizure type, and we are decreasing her AEDs to try to optimize them    Plan:  - Admit to EMU  - vEEG  - Stop Clobazam  - Decrease Keppra to 500mg tonight and tomorrow morning then stop  - continue Lamotrigine 100mg bid  - Seizure and fall precautions   - Ativan 2mg IV for GTCs > 2 min  - Continue Melatonin 5mg

## 2021-08-24 NOTE — H&P ADULT - HISTORY OF PRESENT ILLNESS
This 41y old female with history of epilepsy since 2013. She had 2013 the first event, she went to ER but sent home as panic attack. The event was as she felt abnormal movement on the face and eyes, followed by loss of consciousness for 30min. She had the second seizure in 2018, similar again during sleep. The patient again went to ED and was told it is panic attack and was sent home. The next seizure was in January 30, 2021 and it was the first one witnessed by the significant other. It was mild with movement of the face, eyes and Rt arm. on February 2021 she had sever episode with tongue bite and foaming from the mouth and the Rt arm was flexed and shaking with up rolling eyes. At that point she was seen and MRI was done, showing the lesion described below. She was started on AEDs with no good control. She had another seizure in March, May and then last seizure was in July 19, 2021. One of these episodes was associated with incontinence, and most of them with tongue bite.   The patient will be unconscious for about 30min and then confused for a couple of hours. The duration of each seizure about 30-40 seconds.   She was not having a good sleep, and she did not have a lot of benefit on clonazepam or Ambien. melatonin was added with no much benefit. However, she noticed that her sleep improved greatly after she stopped talking trulia the sweetener for the last few weeks.

## 2021-08-25 LAB
COVID-19 SPIKE DOMAIN AB INTERP: POSITIVE
COVID-19 SPIKE DOMAIN ANTIBODY RESULT: >250 U/ML — HIGH
SARS-COV-2 IGG+IGM SERPL QL IA: >250 U/ML — HIGH
SARS-COV-2 IGG+IGM SERPL QL IA: POSITIVE

## 2021-08-25 PROCEDURE — 99223 1ST HOSP IP/OBS HIGH 75: CPT

## 2021-08-25 PROCEDURE — 95720 EEG PHY/QHP EA INCR W/VEEG: CPT

## 2021-08-25 RX ORDER — ACETAMINOPHEN 500 MG
650 TABLET ORAL EVERY 6 HOURS
Refills: 0 | Status: DISCONTINUED | OUTPATIENT
Start: 2021-08-25 | End: 2021-08-31

## 2021-08-25 RX ADMIN — LAMOTRIGINE 100 MILLIGRAM(S): 25 TABLET, ORALLY DISINTEGRATING ORAL at 22:01

## 2021-08-25 RX ADMIN — LAMOTRIGINE 100 MILLIGRAM(S): 25 TABLET, ORALLY DISINTEGRATING ORAL at 10:06

## 2021-08-25 RX ADMIN — Medication 650 MILLIGRAM(S): at 16:21

## 2021-08-25 RX ADMIN — Medication 650 MILLIGRAM(S): at 15:25

## 2021-08-25 RX ADMIN — LEVETIRACETAM 500 MILLIGRAM(S): 250 TABLET, FILM COATED ORAL at 10:05

## 2021-08-25 NOTE — PROGRESS NOTE ADULT - ASSESSMENT
This 41y old female, has a history of epilepsy with last seizure in july 19, 2021. She is on multiple AEDs. Her MRI showed Rt insular lesion   She is admitted to the EMU to capture one of her seizure to characterize the seizure type, and we are decreasing her AEDs to try to optimize them    Plan:  - vEEG  - Stop Keppra  - continue Lamotrigine 100mg bid  - Seizure and fall precautions   - Ativan 2mg IV for GTCs > 2 min  - Continue Melatonin 5mg

## 2021-08-25 NOTE — EEG REPORT - NS EEG TEXT BOX
[facility] Department of Neurology  Epilepsy Monitoring Unit video-Electroencephalogram    Patient Name:	REYES, DINORAH    :	1980  MRN:	[id number]    Study Start Date/Time:  2021, 6:41:19 PM  Study End Date/Time: in progress    Referred by: Kadi Brunner MD    Brief Clinical History:  DINORAH REYES is a 41 year old Female with intractable epilepsy; study performed to investigate for seizures or markers of epilepsy.      Diagnosis Code:   R56.9 convulsions/seizure  CPT: 05272 EEG with video 12-26h  Technical CPT: 98297 set-up +  16307 vEEG Continuous monitoring 12-26h    Pertinent Medications:  Holding Onfi  Keppra dose reduced to 500 mg BID  Lamictal 100 mg BID    Acquisition Details:  Electroencephalography was acquired using a minimum of 21 channels on an [employer] [facility] system v 8.5.1 with electrode placement according to the standard International 10-20 system following ACNS (American Clinical Neurophysiology Society) guidelines for Long-Term Video EEG monitoring.  Anterior temporal T1 and T2 electrodes were utilized whenever possible.   The [employer] automated spike & seizure detections were all reviewed in detail, in addition to extensive portions of raw EEG.  The live video was continuously monitored by trained technicians to identify events and specialty nurses trained in seizure management supervised the care of the patient in the epilepsy unit.    Day 1: 2021 @ 6:41:19 PM to next morning @ 07:00 am  Background:  continuous, with predominantly alpha and beta.frequencies.  Symmetry:  No persistent asymmetries of voltage or frequency.  Posterior Dominant Rhythm:  obscured by beta activity.  Organization: Normal anterior to posterior gradient.  Voltage:  Normal (20+ uV)  Variability: Yes. 		Reactivity: Yes.  N2 sleep: Symmetric, synchronous spindles and K complexes.  Spontaneous Activity:  No epileptiform discharges.  Periodic/rhythmic activity:  None.  Events:  No electrographic seizures or significant clinical events.  Provocations:  Hyperventilation and Photic stimulation: was not performed.    Daily Summary:    No epileptiform activity and no significant clinical events occurred.  Unremarkable awake and sleep overnight EEG recording.          Hilary Costa MD  Attending Neurologist, [facility] Epilepsy Program

## 2021-08-26 PROCEDURE — 95720 EEG PHY/QHP EA INCR W/VEEG: CPT

## 2021-08-26 PROCEDURE — 99233 SBSQ HOSP IP/OBS HIGH 50: CPT

## 2021-08-26 RX ORDER — LAMOTRIGINE 25 MG/1
50 TABLET, ORALLY DISINTEGRATING ORAL EVERY 12 HOURS
Refills: 0 | Status: DISCONTINUED | OUTPATIENT
Start: 2021-08-26 | End: 2021-08-27

## 2021-08-26 RX ADMIN — LAMOTRIGINE 100 MILLIGRAM(S): 25 TABLET, ORALLY DISINTEGRATING ORAL at 10:52

## 2021-08-26 RX ADMIN — Medication 5 MILLIGRAM(S): at 01:33

## 2021-08-26 RX ADMIN — LAMOTRIGINE 50 MILLIGRAM(S): 25 TABLET, ORALLY DISINTEGRATING ORAL at 22:43

## 2021-08-26 NOTE — EEG REPORT - NS EEG TEXT BOX
[facility] Department of Neurology  Epilepsy Monitoring Unit video-Electroencephalogram    Patient Name:	REYES, DINORAH    :	1980  MRN:	[id number]    Referred by: Kadi Brunner MD    Brief Clinical History:  DINORAH REYES is a 41 year old Female with intractable epilepsy; study performed to investigate for seizures or markers of epilepsy.      Diagnosis Code:   R56.9 convulsions/seizure  CPT: 01083 EEG with video 12-26h  Technical CPT: 36499 set-up +  10999 vEEG Continuous monitoring 12-26h    Acquisition Details:  Electroencephalography was acquired using a minimum of 21 channels on an [employer] [facility] system v 8.5.1 with electrode placement according to the standard International 10-20 system following ACNS (American Clinical Neurophysiology Society) guidelines for Long-Term Video EEG monitoring.  Anterior temporal T1 and T2 electrodes were utilized whenever possible.   The [employer] automated spike & seizure detections were all reviewed in detail, in addition to extensive portions of raw EEG.  The live video was continuously monitored by trained technicians to identify events and specialty nurses trained in seizure management supervised the care of the patient in the epilepsy unit.    Day 2  2021 @ 7 AM to 2021 @ 7 AM     Pertinent medications: off Onfi and Keppra, Lamictal 100 mg BID    Background:  continuous, with predominantly alpha and beta.frequencies.  Symmetry:  No persistent asymmetries of voltage or frequency.  Posterior Dominant Rhythm:  obscured by beta activity.  Organization: Normal anterior to posterior gradient.  Voltage:  Normal (20+ uV)  Variability: Yes. 		Reactivity: Yes.  N2 sleep: Symmetric, synchronous spindles and K complexes.  Spontaneous Activity:  Rare (<1/hour) left frontotemporal (F3, T1) spikes.  Periodic/rhythmic activity:  None.  Events:  No electrographic seizures or significant clinical events.  Provocations:  Hyperventilation and Photic stimulation: did not evoke EEG abnormalities.             Daily Summary:    Rare left frontotemporal spikes, indicating increased epileptic potential.          Hilary Costa MD

## 2021-08-26 NOTE — PROGRESS NOTE ADULT - SUBJECTIVE AND OBJECTIVE BOX
Inteval history:    I saw and examined the patient this morning. She did not have a good sleep overnight, until she asked for the melatonin, which helped her to have some sleep. She mentioned she will suddenly wake up; she denied being stressed overnight.   She was complaining of headache yesterday.   The headache:  Onset: a couple of months before she had the seizure in February and find out about the brain lesion.   Frequency: Daily, but not continuous   Severity: 6-9/10  Location: Rt side of the head, and unilateral most of the times; it may become whole head  Time: no difference between morning or night, but never waked her from sleep  Nature: a pressure like  Aggravators: Lying flat, bending forward  Associations: no nausea, vomiting, photophobia or phonophobia  Reliever: Tylenol may help the pain  There is no progression or worsening of the headache  The patient mentioned that she used to have rare headache before "normal headache"     ROS  As above, otherwise negative for constitutional/HEENT/CV/pulm/GI//MSK/neuro/derm/endocrine/psych.    MEDICATIONS  (STANDING):  lamoTRIgine 100 milliGRAM(s) Oral every 12 hours  melatonin 5 milliGRAM(s) Oral at bedtime    MEDICATIONS  (PRN):  LORazepam   Injectable 2 milliGRAM(s) IV Push once PRN Seizure activity      T(C): 36.4 (08-25-21 @ 05:31), Max: 36.5 (08-24-21 @ 14:30)  HR: 62 (08-25-21 @ 05:31) (60 - 64)  BP: 128/66 (08-25-21 @ 05:31) (118/64 - 128/66)  RR: 16 (08-25-21 @ 05:31) (16 - 16)  SpO2: 99% (08-25-21 @ 05:31) (97% - 99%)  Wt(kg): --    General:  Constitutional:  Sitting comfortably in NAD.  Ears, Nose, Throat: no abnormalities, mucus membranes moist  Neck: supple, no lymphadenopathy  Extremities: no edema, clubbing or cyanosis  Skin: no rash or neurocutaneous signs     Cognitive:  Orientation, language, memory and knowledge screens intact.    Cranial Nerves:  II: ALLEN. III/IV/VI: EOM Full.  Absent nystagmus  V1V2V3: Symmetric, VII: Face appears symmetric VIII: Normal to screening, IX/X: Palate Elevates Symmetrical  XI: Trapezius Symmetric  XII: Tongue midline  Motor:  Power: no pronator drift, power 5/5 distal U/E  Tone: normal x 4 limbs  No tremor  Coordination/Gait:  Finger-nose intact, normal finger taps and rapid-alternating movements,   Reflexes:  DTR: 2+ symmetric all 4 limbs, no clonus      Investigations:  CBC Full  -  ( 24 Aug 2021 18:40 )  WBC Count : 5.74 K/uL  RBC Count : 4.38 M/uL  Hemoglobin : 11.3 g/dL  Hematocrit : 36.3 %  Platelet Count - Automated : 267 K/uL  Mean Cell Volume : 82.9 fl  Mean Cell Hemoglobin : 25.8 pg  Mean Cell Hemoglobin Concentration : 31.1 gm/dL  Auto Neutrophil # : x  Auto Lymphocyte # : x  Auto Monocyte # : x  Auto Eosinophil # : x  Auto Basophil # : x  Auto Neutrophil % : x  Auto Lymphocyte % : x  Auto Monocyte % : x  Auto Eosinophil % : x  Auto Basophil % : x    08-24    136  |  104  |  14  ----------------------------<  100<H>  4.0   |  25  |  0.82    Ca    9.8      24 Aug 2021 18:40  Phos  4.0     08-24  Mg     2.0     08-24    TPro  7.5  /  Alb  4.8  /  TBili  0.3  /  DBili  x   /  AST  18  /  ALT  15  /  AlkPhos  52  08-24    LIVER FUNCTIONS - ( 24 Aug 2021 18:40 )  Alb: 4.8 g/dL / Pro: 7.5 g/dL / ALK PHOS: 52 U/L / ALT: 15 U/L / AST: 18 U/L / GGT: x                 EEG:  Day 1: 8/24/2021 @ 6:41:19 PM to next morning @ 07:00 am  No epileptiform activity and no significant clinical events occurred.  Unremarkable awake and sleep overnight EEG recording.

## 2021-08-26 NOTE — PROGRESS NOTE ADULT - ASSESSMENT
This 41y old female, has a history of epilepsy with last seizure in july 19, 2021. She is on multiple AEDs. Her MRI showed Rt insular lesion   She is admitted to the EMU to capture one of her seizure to characterize the seizure type, and we are decreasing her AEDs to try to optimize them    Plan:  - vEEG  - continue Lamotrigine 100mg bid  - Seizure and fall precautions   - Ativan 2mg IV for GTCs > 2 min  - Continue Melatonin 5mg  This 41y old female, has a history of epilepsy with last seizure in july 19, 2021. She is on multiple AEDs. Her MRI showed Rt insular lesion   She is admitted to the EMU to capture one of her seizure to characterize the seizure type, and we are decreasing her AEDs to try to optimize them    Plan:  - vEEG  - Decrease Lamotrigine to 50mg bid  - Seizure and fall precautions   - Ativan 2mg IV for GTCs > 2 min  - Continue Melatonin 5mg

## 2021-08-27 LAB — LAMOTRIGINE SERPL-MCNC: 4.8 UG/ML — SIGNIFICANT CHANGE UP (ref 2–20)

## 2021-08-27 PROCEDURE — 99233 SBSQ HOSP IP/OBS HIGH 50: CPT

## 2021-08-27 PROCEDURE — 95720 EEG PHY/QHP EA INCR W/VEEG: CPT

## 2021-08-27 RX ORDER — LAMOTRIGINE 25 MG/1
150 TABLET, ORALLY DISINTEGRATING ORAL
Refills: 0 | Status: DISCONTINUED | OUTPATIENT
Start: 2021-08-27 | End: 2021-08-31

## 2021-08-27 RX ADMIN — LAMOTRIGINE 150 MILLIGRAM(S): 25 TABLET, ORALLY DISINTEGRATING ORAL at 23:14

## 2021-08-27 RX ADMIN — Medication 650 MILLIGRAM(S): at 21:18

## 2021-08-27 RX ADMIN — LAMOTRIGINE 50 MILLIGRAM(S): 25 TABLET, ORALLY DISINTEGRATING ORAL at 21:18

## 2021-08-27 RX ADMIN — LAMOTRIGINE 50 MILLIGRAM(S): 25 TABLET, ORALLY DISINTEGRATING ORAL at 11:15

## 2021-08-27 RX ADMIN — Medication 5 MILLIGRAM(S): at 21:18

## 2021-08-27 RX ADMIN — Medication 2 MILLIGRAM(S): at 21:44

## 2021-08-27 NOTE — PROVIDER CONTACT NOTE (CHANGE IN STATUS NOTIFICATION) - ASSESSMENT
Patient had tonic clonic seizure for approximately 2 minutes. See flowsheet for VS. MD Barton at bedside. Patient post ictal and reoriented after

## 2021-08-27 NOTE — DIETITIAN INITIAL EVALUATION ADULT. - ADD RECOMMEND
1. Continue regular diet, encourage intake 2. Add antiemetic 3. Trend wts 4. Monitor lytes and replete 5. RD to remain available prn

## 2021-08-27 NOTE — EEG REPORT - NS EEG TEXT BOX
Day 3: 8/26/2021 @ 7 AM to 8/27/2021 @ 7 AM     Background:  continuous, with predominantly alpha and beta.frequencies.  Symmetry:  No persistent asymmetries of voltage or frequency.  Posterior Dominant Rhythm:  obscured by beta activity.  Organization: Normal anterior to posterior gradient.  Voltage:  Normal (20+ uV)  Variability: Yes. 		Reactivity: Yes.  N2 sleep: Symmetric, synchronous spindles and K complexes.  Spontaneous Activity:  No epileptiform discharges.  Periodic/rhythmic activity:  None.  Events:  No electrographic seizures or significant clinical events.  Provocations:  Hyperventilation and Photic stimulation: was not performed.    Daily Summary:    No epileptiform activity and no significant clinical events occurred.  Unremarkable awake and sleep overnight EEG recording.      Hilary Costa MD     [facility] Department of Neurology  Epilepsy Monitoring Unit video-Electroencephalogram    Patient Name:	REYES, DINORAH    :	1980  MRN:	[id number]    Referred by: Kadi Brunner MD    Brief Clinical History:  DINORAH REYES is a 41 year old Female with intractable epilepsy; study performed to investigate for seizures or markers of epilepsy.      Diagnosis Code:   R56.9 convulsions/seizure  CPT: 97122 EEG with video 12-26h  Technical CPT: 91955 set-up +  81815 vEEG Continuous monitoring 12-26h    Acquisition Details:  Electroencephalography was acquired using a minimum of 21 channels on an [employer] system v 8.5.1 with electrode placement according to the standard International 10-20 system following ACNS (American Clinical Neurophysiology Society) guidelines for Long-Term Video EEG monitoring.  Anterior temporal T1 and T2 electrodes were utilized whenever possible.   The [employer] automated spike & seizure detections were all reviewed in detail, in addition to extensive portions of raw EEG.  The live video was continuously monitored by trained technicians to identify events and specialty nurses trained in seizure management supervised the care of the patient in the epilepsy unit.    Day 3: 2021 @ 7 AM to 2021 @ 7 AM     Background:  continuous, with predominantly alpha and beta.frequencies.  Symmetry:  No persistent asymmetries of voltage or frequency.  Posterior Dominant Rhythm:  obscured by beta activity.  Organization: Normal anterior to posterior gradient.  Voltage:  Normal (20+ uV)  Variability: Yes. 		Reactivity: Yes.  N2 sleep: Symmetric, synchronous spindles and K complexes.  Spontaneous Activity:  No epileptiform discharges.  Periodic/rhythmic activity:  None.  Events:  No electrographic seizures or significant clinical events.  Provocations:  Hyperventilation and Photic stimulation: was not performed.    Daily Summary:    No epileptiform activity and no significant clinical events occurred.  Unremarkable awake and sleep overnight EEG recording.      Hilary Costa MD     [facility] Department of Neurology  Epilepsy Monitoring Unit video-Electroencephalogram    Patient Name:	REYES, DINORAH    :	1980  MRN:	[id number]    Referred by: Kadi Brunner MD    Brief Clinical History:  DINORAH REYES is a 41 year old Female with intractable epilepsy; study performed to investigate for seizures or markers of epilepsy.      Diagnosis Code:   R56.9 convulsions/seizure  CPT: 25716 EEG with video 12-26h  Technical CPT: 16029 set-up +  98211 vEEG Continuous monitoring 12-26h    Acquisition Details:  Electroencephalography was acquired using a minimum of 21 channels on an [employer] system v 8.5.1 with electrode placement according to the standard International 10-20 system following ACNS (American Clinical Neurophysiology Society) guidelines for Long-Term Video EEG monitoring.  Anterior temporal T1 and T2 electrodes were utilized whenever possible.   The [employer] automated spike & seizure detections were all reviewed in detail, in addition to extensive portions of raw EEG.  The live video was continuously monitored by trained technicians to identify events and specialty nurses trained in seizure management supervised the care of the patient in the epilepsy unit.    Day 3: 2021 @ 7 AM to 2021 @ 7 AM     Pertinent medications: Lamictal 50 mg BID    Background:  continuous, with predominantly alpha and beta.frequencies.  Symmetry:  No persistent asymmetries of voltage or frequency.  Posterior Dominant Rhythm:  obscured by beta activity.  Organization: Normal anterior to posterior gradient.  Voltage:  Normal (20+ uV)  Variability: Yes. 		Reactivity: Yes.  N2 sleep: Symmetric, synchronous spindles and K complexes.  Spontaneous Activity:  No epileptiform discharges.  Periodic/rhythmic activity:  None.  Events:  No electrographic seizures or significant clinical events.  Provocations:  Hyperventilation and Photic stimulation: was not performed.    Daily Summary:    No epileptiform activity and no significant clinical events occurred.  Unremarkable awake and sleep overnight EEG recording.      Hilary Costa MD

## 2021-08-27 NOTE — PROGRESS NOTE ADULT - SUBJECTIVE AND OBJECTIVE BOX
Inteval history:    I saw and examined the patient this morning. She did not have a good sleep overnight. This morning the patient had GTC for less than a minute, with post ictal confusion for about 15 minutes, with tongue bite but not incontinence.         ROS  As above, otherwise negative for constitutional/HEENT/CV/pulm/GI//MSK/neuro/derm/endocrine/psych.    MEDICATIONS  (STANDING):  lamoTRIgine 100 milliGRAM(s) Oral every 12 hours  melatonin 5 milliGRAM(s) Oral at bedtime    MEDICATIONS  (PRN):  LORazepam   Injectable 2 milliGRAM(s) IV Push once PRN Seizure activity      T(C): 36.4 (08-25-21 @ 05:31), Max: 36.5 (08-24-21 @ 14:30)  HR: 62 (08-25-21 @ 05:31) (60 - 64)  BP: 128/66 (08-25-21 @ 05:31) (118/64 - 128/66)  RR: 16 (08-25-21 @ 05:31) (16 - 16)  SpO2: 99% (08-25-21 @ 05:31) (97% - 99%)  Wt(kg): --    General:  Constitutional:  Sitting comfortably in NAD.  Ears, Nose, Throat: no abnormalities, mucus membranes moist  Neck: supple, no lymphadenopathy  Extremities: no edema, clubbing or cyanosis  Skin: no rash or neurocutaneous signs     Cognitive:  Orientation, language, memory and knowledge screens intact.    Cranial Nerves:  II: ALLEN. III/IV/VI: EOM Full.  Absent nystagmus  V1V2V3: Symmetric, VII: Face appears symmetric VIII: Normal to screening, IX/X: Palate Elevates Symmetrical  XI: Trapezius Symmetric  XII: Tongue midline  Motor:  Power: no pronator drift, power 5/5 distal U/E  Tone: normal x 4 limbs  No tremor  Coordination/Gait:  Finger-nose intact, normal finger taps and rapid-alternating movements,   Reflexes:  DTR: 2+ symmetric all 4 limbs, no clonus      Investigations:  CBC Full  -  ( 24 Aug 2021 18:40 )  WBC Count : 5.74 K/uL  RBC Count : 4.38 M/uL  Hemoglobin : 11.3 g/dL  Hematocrit : 36.3 %  Platelet Count - Automated : 267 K/uL  Mean Cell Volume : 82.9 fl  Mean Cell Hemoglobin : 25.8 pg  Mean Cell Hemoglobin Concentration : 31.1 gm/dL  Auto Neutrophil # : x  Auto Lymphocyte # : x  Auto Monocyte # : x  Auto Eosinophil # : x  Auto Basophil # : x  Auto Neutrophil % : x  Auto Lymphocyte % : x  Auto Monocyte % : x  Auto Eosinophil % : x  Auto Basophil % : x    08-24    136  |  104  |  14  ----------------------------<  100<H>  4.0   |  25  |  0.82    Ca    9.8      24 Aug 2021 18:40  Phos  4.0     08-24  Mg     2.0     08-24    TPro  7.5  /  Alb  4.8  /  TBili  0.3  /  DBili  x   /  AST  18  /  ALT  15  /  AlkPhos  52  08-24    LIVER FUNCTIONS - ( 24 Aug 2021 18:40 )  Alb: 4.8 g/dL / Pro: 7.5 g/dL / ALK PHOS: 52 U/L / ALT: 15 U/L / AST: 18 U/L / GGT: x                 EEG:  Day 1: 8/24/2021 @ 6:41:19 PM to next morning @ 07:00 am  No epileptiform activity and no significant clinical events occurred.  Unremarkable awake and sleep overnight EEG recording.

## 2021-08-27 NOTE — PROGRESS NOTE ADULT - ASSESSMENT
This 41y old female, has a history of epilepsy with last seizure in july 19, 2021. She is on multiple AEDs. Her MRI showed Rt insular lesion   She is admitted to the EMU to capture one of her seizure to characterize the seizure type, and we are decreasing her AEDs to try to optimize them    Plan:  - Continue vEEG  - Continue Lamotrigine to 50mg bid  - Seizure and fall precautions   - Ativan 2mg IV for GTCs > 2 min

## 2021-08-27 NOTE — DIETITIAN INITIAL EVALUATION ADULT. - OTHER INFO
41y old female, has a history of epilepsy with last seizure in july 19, 2021. She is on multiple AEDs. Her MRI showed Rt insular lesion. She is admitted to the EMU to capture one of her seizure to characterize the seizure type, and we are decreasing her AEDs to try to optimize them.    Pt seen in room, resting in bed. Pt reports decreased appetite at present 2/2 nausea. PTA pt with good appetite. NKFA. Pt reports wt stability ~150lbs, consistent with admit wt. No edema noted. No pain noted. Please see full recs below. Will continue to follow per RD protocol.

## 2021-08-27 NOTE — DIETITIAN INITIAL EVALUATION ADULT. - ENERGY INTAKE
Patient position supine. Patient left abdomen  Prepped wide  and draped per unit standard.    Safety straps applied:N/A.   Poor (<50%)

## 2021-08-28 LAB — LEVETIRACETAM SERPL-MCNC: 12.8 UG/ML — SIGNIFICANT CHANGE UP (ref 10–40)

## 2021-08-28 PROCEDURE — 99233 SBSQ HOSP IP/OBS HIGH 50: CPT

## 2021-08-28 PROCEDURE — 95720 EEG PHY/QHP EA INCR W/VEEG: CPT

## 2021-08-28 RX ORDER — LEVETIRACETAM 250 MG/1
1000 TABLET, FILM COATED ORAL EVERY 12 HOURS
Refills: 0 | Status: DISCONTINUED | OUTPATIENT
Start: 2021-08-28 | End: 2021-08-31

## 2021-08-28 RX ORDER — LEVETIRACETAM 250 MG/1
1000 TABLET, FILM COATED ORAL ONCE
Refills: 0 | Status: COMPLETED | OUTPATIENT
Start: 2021-08-28 | End: 2021-08-28

## 2021-08-28 RX ORDER — CLOBAZAM 10 MG/1
5 TABLET ORAL AT BEDTIME
Refills: 0 | Status: DISCONTINUED | OUTPATIENT
Start: 2021-08-28 | End: 2021-08-31

## 2021-08-28 RX ORDER — IBUPROFEN 200 MG
400 TABLET ORAL EVERY 6 HOURS
Refills: 0 | Status: DISCONTINUED | OUTPATIENT
Start: 2021-08-28 | End: 2021-08-31

## 2021-08-28 RX ADMIN — Medication 2 MILLIGRAM(S): at 08:52

## 2021-08-28 RX ADMIN — CLOBAZAM 5 MILLIGRAM(S): 10 TABLET ORAL at 21:39

## 2021-08-28 RX ADMIN — LAMOTRIGINE 150 MILLIGRAM(S): 25 TABLET, ORALLY DISINTEGRATING ORAL at 17:36

## 2021-08-28 RX ADMIN — LAMOTRIGINE 150 MILLIGRAM(S): 25 TABLET, ORALLY DISINTEGRATING ORAL at 05:26

## 2021-08-28 RX ADMIN — Medication 5 MILLIGRAM(S): at 21:40

## 2021-08-28 RX ADMIN — LEVETIRACETAM 1000 MILLIGRAM(S): 250 TABLET, FILM COATED ORAL at 21:39

## 2021-08-28 RX ADMIN — LEVETIRACETAM 400 MILLIGRAM(S): 250 TABLET, FILM COATED ORAL at 08:53

## 2021-08-28 NOTE — EEG REPORT - NS EEG TEXT BOX
Day 4: 8/27/2021 @ 7 AM to 8/28/2021 @ 7 AM     Background:  continuous, with predominantly alpha and beta.frequencies.  Symmetry:  No persistent asymmetries of voltage or frequency.  Posterior Dominant Rhythm:  obscured by beta activity.  Organization: Normal anterior to posterior gradient.  Voltage:  Normal (20+ uV)  Variability: Yes. 		Reactivity: Yes.  N2 sleep: Symmetric, synchronous spindles and K complexes.  Spontaneous Activity:  Frequent (1+/min < 1/10s) spikes with a broad distribution, often maximal in the left frontotemporal or right temporal regions.            Periodic/rhythmic activity:  None.  Events:  5 electroclinical seizures were recorded as described below.  All 5 seizures were characterized by rightward head version followed by left arm tonic extension, right arm flexion, and generalized tonic posturing followed by clonic jerking.  Electrographically, the onset was diffuse, though at times the onset was preceded by left hemisphere sharply contoured slowing (#2) or the onset was higher in amplitude over the left hemisphere (#4).  One seizure (#3) had right temporal lateralized periodic discharges at offset, while two others (#4,#5) had left frontotemporal periodic discharges at offset.  Provocations:  Hyperventilation and Photic stimulation: was not performed.    10:25:33		xx SEIZURE #1  10:25:35		starts to straighten legs  10:25:37		simultaneous diffuse onset, possibly higher amplitude at Fp1 T1  10:25:37		left arm elevates, right arm flexes, head turn to right  10:25:42		tonic extension of legs  10:25:46		left arm extended, right arm flexed  10:25:51		vocalizing  10:26:19		slowing down  10:26:25		RN into room  10:26:32		1 Hz jerks  10:26:44		off    21:19:35		xx SEIZURE #2  21:19:38		L frontotemp sharply contoured in BP, but looks more R temp in avg  21:19:43		diffuse attenuation  21:19:45		again simultaneous diffuse onset  21:19:46		head turn to R  21:19:52		L arm extended  21:19:58		vocalizing  21:20:08		RN into room  21:20:20		slowing  21:20:41		1 Hz clonic  21:20:48		off    22:17:01		xx SEIZURE #3  22:17:03		L frontotemporal spikes -- interictal vs beginning of seizure?  22:17:14		looking for button?  22:17:18		starts to have behavioral arrest  22:17:21		head turn to R, this time R arm elevates  22:17:30		now left arm extended  22:17:33		RN into room  22:17:58		slowing down  22:18:22		R temp periodic discharges at off  22:18:47		off    05:46:34		xx SEIZURE #4  05:46:38		L frontotemporal spikier and higher amplitude at onset  05:46:41		sleeping  05:46:43		shaking  05:46:50		both arms flexed, clonic  05:46:53		now left arm extends  05:46:59		RN into room  05:47:34		L ft sharps at offet    06:46:46		xx SEIZURE #5  06:46:48		L frontal  06:46:54		sleeping  06:46:55		T1 maximal L frontotemporal  06:47:03		seizure starts, hard to see under sheets  06:47:11		L arm seems to be extending  06:47:21		clonic  06:47:22		RN into room  06:48:00		L ft LPDs at offset  06:48:13		off                   Daily Summary:    Five electroclinical seizures as described above, possibly with left hemisphere onset with rapid bisynchrony.  Frequent epileptiform discharges, at times maximal in the left frontotemporal or right temporal regions.  These findings are suggestive of diffuse or multifocal epileptic potential, possibly maximal in the left frontotemporal and right temporal regions.        Hilary Costa MD     [facility] Department of Neurology  Epilepsy Monitoring Unit video-Electroencephalogram    Patient Name:	REYES, DINORAH    :	1980  MRN:	[id number]    Referred by: Kadi Brunner MD    Brief Clinical History:  DINORAH REYES is a 41 year old Female with intractable epilepsy; study performed to investigate for seizures or markers of epilepsy.      Diagnosis Code:   R56.9 convulsions/seizure  CPT: 20862 EEG with video 12-26h  Technical CPT: 28840 set-up +  90344 vEEG Continuous monitoring 12-26h    Acquisition Details:  Electroencephalography was acquired using a minimum of 21 channels on an [employer] system v 8.5.1 with electrode placement according to the standard International 10-20 system following ACNS (American Clinical Neurophysiology Society) guidelines for Long-Term Video EEG monitoring.  Anterior temporal T1 and T2 electrodes were utilized whenever possible.   The XLTEK automated spike & seizure detections were all reviewed in detail, in addition to extensive portions of raw EEG.  The live video was continuously monitored by trained technicians to identify events and specialty nurses trained in seizure management supervised the care of the patient in the epilepsy unit.    Day 4: 2021 @ 7 AM to 2021 @ 7 AM     Background:  continuous, with predominantly alpha and beta.frequencies.  Symmetry:  No persistent asymmetries of voltage or frequency.  Posterior Dominant Rhythm:  obscured by beta activity.  Organization: Normal anterior to posterior gradient.  Voltage:  Normal (20+ uV)  Variability: Yes. 		Reactivity: Yes.  N2 sleep: Symmetric, synchronous spindles and K complexes.  Spontaneous Activity:  Frequent (1+/min < 1/10s) spikes with a broad distribution, often maximal in the left frontotemporal or right temporal regions.  Periodic/rhythmic activity:  None.  Events:  5 electroclinical seizures were recorded as described below.  All 5 seizures were characterized by rightward head version followed by left arm tonic extension, right arm flexion, and generalized tonic posturing followed by clonic jerking.  Electrographically, the onset was diffuse, though at times the onset was preceded by left hemisphere sharply contoured slowing (#2) or the onset was higher in amplitude over the left hemisphere (#4).  One seizure (#3) had right temporal lateralized periodic discharges at offset, while two others (#4,#5) had left frontotemporal periodic discharges at offset.  Provocations:  Hyperventilation and Photic stimulation: was not performed.    10:25:33		xx SEIZURE #1  10:25:35		starts to straighten legs  10:25:37		simultaneous diffuse onset, possibly higher amplitude at Fp1 T1  10:25:37		left arm elevates, right arm flexes, head turn to right  10:25:42		tonic extension of legs  10:25:46		left arm extended, right arm flexed  10:25:51		vocalizing  10:26:19		slowing down  10:26:25		RN into room  10:26:32		1 Hz jerks  10:26:44		off    21:19:35		xx SEIZURE #2  21:19:38		L frontotemp sharply contoured in BP, but looks more R temp in avg  21:19:43		diffuse attenuation  21:19:45		again simultaneous diffuse onset  21:19:46		head turn to R  21:19:52		L arm extended  21:19:58		vocalizing  21:20:08		RN into room  21:20:20		slowing  21:20:41		1 Hz clonic  21:20:48		off    22:17:01		xx SEIZURE #3  22:17:03		L frontotemporal spikes -- interictal vs beginning of seizure?  22:17:14		looking for button?  22:17:18		starts to have behavioral arrest  22:17:21		head turn to R, this time R arm elevates  22:17:30		now left arm extended  22:17:33		RN into room  22:17:58		slowing down  22:18:22		R temp periodic discharges at off  22:18:47		off    05:46:34		xx SEIZURE #4  05:46:38		L frontotemporal spikier and higher amplitude at onset  05:46:41		sleeping  05:46:43		shaking  05:46:50		both arms flexed, clonic  05:46:53		now left arm extends  05:46:59		RN into room  05:47:34		L ft sharps at offet    06:46:46		xx SEIZURE #5  06:46:48		L frontal  06:46:54		sleeping  06:46:55		T1 maximal L frontotemporal  06:47:03		seizure starts, hard to see under sheets  06:47:11		L arm seems to be extending  06:47:21		clonic  06:47:22		RN into room  06:48:00		L ft LPDs at offset  06:48:13		off        Daily Summary:    Five electroclinical seizures as described above, possibly with left hemisphere onset with rapid bisynchrony.  Frequent epileptiform discharges, at times maximal in the left frontotemporal or right temporal regions.  These findings are suggestive of diffuse or multifocal epileptic potential, possibly maximal in the left frontotemporal and right temporal regions.        Hilary Costa MD       [facility] Department of Neurology  Epilepsy Monitoring Unit video-Electroencephalogram    Patient Name:	REYES, DINORAH    :	1980  MRN:	[id number]    Referred by: Kadi Brunner MD    Brief Clinical History:  DINORAH REYES is a 41 year old Female with intractable epilepsy; study performed to investigate for seizures or markers of epilepsy.      Diagnosis Code:   R56.9 convulsions/seizure  CPT: 97912 EEG with video 12-26h  Technical CPT: 33646 set-up +  45620 vEEG Continuous monitoring 12-26h    Acquisition Details:  Electroencephalography was acquired using a minimum of 21 channels on an [employer] system v 8.5.1 with electrode placement according to the standard International 10-20 system following ACNS (American Clinical Neurophysiology Society) guidelines for Long-Term Video EEG monitoring.  Anterior temporal T1 and T2 electrodes were utilized whenever possible.   The XLTEK automated spike & seizure detections were all reviewed in detail, in addition to extensive portions of raw EEG.  The live video was continuously monitored by trained technicians to identify events and specialty nurses trained in seizure management supervised the care of the patient in the epilepsy unit.    Day 4: 2021 @ 7 AM to 2021 @ 7 AM     Pertinent medications: Lamcital 50 mg BID    Background:  continuous, with predominantly alpha and beta frequencies.  Symmetry:  No persistent asymmetries of voltage or frequency.  Posterior Dominant Rhythm:  obscured by beta activity.  Organization: Normal anterior to posterior gradient.  Voltage:  Normal (20+ uV)  Variability: Yes. 		Reactivity: Yes.  N2 sleep: Symmetric, synchronous spindles and K complexes.  Spontaneous Activity:  Frequent (1+/min < 1/10s) spikes with a broad distribution, often maximal in the left frontotemporal or right temporal regions.  Periodic/rhythmic activity:  None.  Events:  5 electroclinical seizures were recorded as described below.  All 5 seizures were characterized by rightward head version followed by left arm tonic extension, right arm flexion, and generalized tonic posturing followed by clonic jerking.  Electrographically, the onset was diffuse, though at times the onset was preceded by left hemisphere sharply contoured slowing (#2) or the onset was higher in amplitude over the left hemisphere (#4).  One seizure (#3) had right temporal lateralized periodic discharges at offset, while two others (#4,#5) had left frontotemporal periodic discharges at offset.  Provocations:  Hyperventilation and Photic stimulation: was not performed.    10:25:33		xx SEIZURE #1  10:25:35		starts to straighten legs  10:25:37		simultaneous diffuse onset, possibly higher amplitude at Fp1 T1  10:25:37		left arm elevates, right arm flexes, head turn to right  10:25:42		tonic extension of legs  10:25:46		left arm extended, right arm flexed  10:25:51		vocalizing  10::19		slowing down  10:26:25		RN into room  10:26:32		1 Hz jerks  10:26:44		off    21:19:35		xx SEIZURE #2  21:19:38		L frontotemp sharply contoured in BP, but looks more R temp in avg  21:19:43		diffuse attenuation  21:19:45		again simultaneous diffuse onset  21:19:46		head turn to R  21:19:52		L arm extended  21:19:58		vocalizing  21:20:08		RN into room  21:20:20		slowing  21:20:41		1 Hz clonic  21:20:48		off    22:17:01		xx SEIZURE #3  22:17:03		L frontotemporal spikes -- interictal vs beginning of seizure?  22:17:14		looking for button?  22:17:18		starts to have behavioral arrest  22:17:21		head turn to R, this time R arm elevates  22:17:30		now left arm extended  22:17:33		RN into room  22:17:58		slowing down  22:18:22		R temp periodic discharges at off  22:18:47		off    05:46:34		xx SEIZURE #4  05:46:38		L frontotemporal spikier and higher amplitude at onset  05:46:41		sleeping  05:46:43		shaking  05:46:50		both arms flexed, clonic  05:46:53		now left arm extends  05:46:59		RN into room  05:47:34		L ft sharps at offet    06:46:46		xx SEIZURE #5  06:46:48		L frontal  06:46:54		sleeping  06:46:55		T1 maximal L frontotemporal  06:47:03		seizure starts, hard to see under sheets  06:47:11		L arm seems to be extending  06:47:21		clonic  06:47:22		RN into room  06:48:00		L ft LPDs at offset  06:48:13		off        Daily Summary:    Five electroclinical seizures as described above, possibly with left hemisphere onset with rapid bisynchrony.  Frequent epileptiform discharges, at times maximal in the left frontotemporal or right temporal regions.  These findings are suggestive of diffuse or multifocal epileptic potential, possibly maximal in the left frontotemporal and right temporal regions.        Hilary Costa MD

## 2021-08-28 NOTE — PROGRESS NOTE ADULT - ASSESSMENT
This 41y old female, has a history of epilepsy with last seizure in july 19, 2021. She is on multiple AEDs. Her MRI showed Rt insular lesion   She is admitted to the EMU to capture one of her seizure to characterize the seizure type, and we are decreasing her AEDs to try to optimize them    Plan:  - Continue vEEG  - Increase Lamotrigine to 150mg bid  - Ativan 2mg IV stat  - Keppra 1000mg IV stat  - Resume her home Keppra dose 500mg bid   - Seizure and fall precautions   - Ativan 2mg IV for GTCs > 2 min    This 41y old female, has a history of epilepsy with last seizure in july 19, 2021. She is on multiple AEDs. Her MRI showed Rt insular lesion   She is admitted to the EMU to capture one of her seizure to characterize the seizure type, and we are decreasing her AEDs to try to optimize them    Plan:  - Continue vEEG  - Increase Lamotrigine to 150mg bid  - Ativan 2mg IV stat  - Keppra 1000mg IV stat  - Resume her home Keppra dose 500mg bid   - Restart Clobazam 5mg at bedtime   - Seizure and fall precautions   - Ativan 2mg IV for GTCs > 2 min

## 2021-08-28 NOTE — PROGRESS NOTE ADULT - SUBJECTIVE AND OBJECTIVE BOX
Inteval history:    I saw and examined the patient this morning. The patient had 4 GTCs, the last one of them was around 5:40am. The patient received 2mg ativan after the first seizure only. Her dose of Lamotrigine was increased to 150mg q12h since last night.     The patient mentioned she is complaining of headache that is holocephalic, with no nausea or vomiting. She feels tired.       ROS  As above, otherwise negative for constitutional/HEENT/CV/pulm/GI//MSK/neuro/derm/endocrine/psych.    MEDICATIONS  (STANDING):  lamoTRIgine 100 milliGRAM(s) Oral every 12 hours  melatonin 5 milliGRAM(s) Oral at bedtime    MEDICATIONS  (PRN):  LORazepam   Injectable 2 milliGRAM(s) IV Push once PRN Seizure activity      T(C): 36.4 (08-25-21 @ 05:31), Max: 36.5 (08-24-21 @ 14:30)  HR: 62 (08-25-21 @ 05:31) (60 - 64)  BP: 128/66 (08-25-21 @ 05:31) (118/64 - 128/66)  RR: 16 (08-25-21 @ 05:31) (16 - 16)  SpO2: 99% (08-25-21 @ 05:31) (97% - 99%)  Wt(kg): --    General:  Constitutional:  Sitting comfortably in NAD.  Ears, Nose, Throat: no abnormalities, mucus membranes moist  Neck: supple, no lymphadenopathy  Extremities: no edema, clubbing or cyanosis  Skin: no rash or neurocutaneous signs   Tongue bitted from both sides    Cognitive:  Orientation, language (Mild dysarthria caused by the tongue bite), memory and knowledge screens intact.    Cranial Nerves:  II: ALLEN. III/IV/VI: EOM Full.  Absent nystagmus  V1V2V3: Symmetric, VII: Face appears symmetric VIII: Normal to screening, IX/X: Palate Elevates Symmetrical  XI: Trapezius Symmetric  XII: Tongue midline  Motor:  Power: no pronator drift, power 5/5 distal U/E  Tone: normal x 4 limbs  No tremor  Coordination/Gait:  Finger-nose intact, normal finger taps and rapid-alternating movements,   Reflexes:  DTR: 2+ symmetric all 4 limbs, no clonus      Investigations:  CBC Full  -  ( 24 Aug 2021 18:40 )  WBC Count : 5.74 K/uL  RBC Count : 4.38 M/uL  Hemoglobin : 11.3 g/dL  Hematocrit : 36.3 %  Platelet Count - Automated : 267 K/uL  Mean Cell Volume : 82.9 fl  Mean Cell Hemoglobin : 25.8 pg  Mean Cell Hemoglobin Concentration : 31.1 gm/dL  Auto Neutrophil # : x  Auto Lymphocyte # : x  Auto Monocyte # : x  Auto Eosinophil # : x  Auto Basophil # : x  Auto Neutrophil % : x  Auto Lymphocyte % : x  Auto Monocyte % : x  Auto Eosinophil % : x  Auto Basophil % : x    08-24    136  |  104  |  14  ----------------------------<  100<H>  4.0   |  25  |  0.82    Ca    9.8      24 Aug 2021 18:40  Phos  4.0     08-24  Mg     2.0     08-24    TPro  7.5  /  Alb  4.8  /  TBili  0.3  /  DBili  x   /  AST  18  /  ALT  15  /  AlkPhos  52  08-24    LIVER FUNCTIONS - ( 24 Aug 2021 18:40 )  Alb: 4.8 g/dL / Pro: 7.5 g/dL / ALK PHOS: 52 U/L / ALT: 15 U/L / AST: 18 U/L / GGT: x                 EEG:  Day 1: 8/24/2021 @ 6:41:19 PM to next morning @ 07:00 am  No epileptiform activity and no significant clinical events occurred.  Unremarkable awake and sleep overnight EEG recording.    Day 2  8/25/2021 @ 7 AM to 8/26/2021 @ 7 AM   Rare left frontotemporal spikes, indicating increased epileptic potential.

## 2021-08-29 ENCOUNTER — TRANSCRIPTION ENCOUNTER (OUTPATIENT)
Age: 41
End: 2021-08-29

## 2021-08-29 PROCEDURE — 72070 X-RAY EXAM THORAC SPINE 2VWS: CPT | Mod: 26

## 2021-08-29 PROCEDURE — 95720 EEG PHY/QHP EA INCR W/VEEG: CPT

## 2021-08-29 PROCEDURE — 72100 X-RAY EXAM L-S SPINE 2/3 VWS: CPT | Mod: 26

## 2021-08-29 RX ORDER — LIDOCAINE 4 G/100G
1 CREAM TOPICAL EVERY 24 HOURS
Refills: 0 | Status: DISCONTINUED | OUTPATIENT
Start: 2021-08-29 | End: 2021-08-31

## 2021-08-29 RX ORDER — CLOBAZAM 10 MG/1
5 TABLET ORAL
Qty: 0 | Refills: 0 | DISCHARGE
Start: 2021-08-29

## 2021-08-29 RX ORDER — LAMOTRIGINE 25 MG/1
1 TABLET, ORALLY DISINTEGRATING ORAL
Qty: 0 | Refills: 0 | DISCHARGE

## 2021-08-29 RX ORDER — LAMOTRIGINE 25 MG/1
1 TABLET, ORALLY DISINTEGRATING ORAL
Qty: 0 | Refills: 0 | DISCHARGE
Start: 2021-08-29

## 2021-08-29 RX ADMIN — LAMOTRIGINE 150 MILLIGRAM(S): 25 TABLET, ORALLY DISINTEGRATING ORAL at 05:13

## 2021-08-29 RX ADMIN — Medication 400 MILLIGRAM(S): at 21:40

## 2021-08-29 RX ADMIN — LIDOCAINE 1 PATCH: 4 CREAM TOPICAL at 15:49

## 2021-08-29 RX ADMIN — Medication 5 MILLIGRAM(S): at 21:16

## 2021-08-29 RX ADMIN — Medication 650 MILLIGRAM(S): at 06:00

## 2021-08-29 RX ADMIN — Medication 650 MILLIGRAM(S): at 05:13

## 2021-08-29 RX ADMIN — LEVETIRACETAM 1000 MILLIGRAM(S): 250 TABLET, FILM COATED ORAL at 21:17

## 2021-08-29 RX ADMIN — Medication 650 MILLIGRAM(S): at 13:00

## 2021-08-29 RX ADMIN — LEVETIRACETAM 1000 MILLIGRAM(S): 250 TABLET, FILM COATED ORAL at 10:18

## 2021-08-29 RX ADMIN — Medication 650 MILLIGRAM(S): at 12:52

## 2021-08-29 RX ADMIN — LIDOCAINE 1 PATCH: 4 CREAM TOPICAL at 18:06

## 2021-08-29 RX ADMIN — Medication 400 MILLIGRAM(S): at 22:18

## 2021-08-29 RX ADMIN — LAMOTRIGINE 150 MILLIGRAM(S): 25 TABLET, ORALLY DISINTEGRATING ORAL at 17:48

## 2021-08-29 RX ADMIN — CLOBAZAM 5 MILLIGRAM(S): 10 TABLET ORAL at 21:17

## 2021-08-29 NOTE — EEG REPORT - NS EEG TEXT BOX
[facility] Department of Neurology  Epilepsy Monitoring Unit video-Electroencephalogram    Patient Name:	REYES, DINORAH    :	1980  MRN:	[id number]    Referred by: Kadi Brunner MD    Brief Clinical History:  DINORAH REYES is a 41 year old Female with intractable epilepsy; study performed to investigate for seizures or markers of epilepsy.      Diagnosis Code:   R56.9 convulsions/seizure  CPT: 74812 EEG with video 12-26h  Technical CPT: 01421 set-up +  23190 vEEG Continuous monitoring 12-26h    Acquisition Details:  Electroencephalography was acquired using a minimum of 21 channels on an [employer] Neu[employer] system v 8.5.1 with electrode placement according to the standard International 10-20 system following ACNS (American Clinical Neurophysiology Society) guidelines for Long-Term Video EEG monitoring.  Anterior temporal T1 and T2 electrodes were utilized whenever possible.   The [employer] automated spike & seizure detections were all reviewed in detail, in addition to extensive portions of raw EEG.  The live video was continuously monitored by trained technicians to identify events and specialty nurses trained in seizure management supervised the care of the patient in the epilepsy unit.    Day 5: 2021 @ 7 AM to 2021 @ 7 AM     Pertinent medications: Lamictal 150 mg BID, Keppra 1g BID, Onfi 5 mg QHS    Background:  continuous, with predominantly alpha and beta.frequencies.  Symmetry:  No persistent asymmetries of voltage or frequency.  Posterior Dominant Rhythm:  obscured by beta activity.  Organization: Normal anterior to posterior gradient.  Voltage:  Normal (20+ uV)  Variability: Yes. 		Reactivity: Yes.  N2 sleep: Symmetric, synchronous spindles and K complexes.  Spontaneous Activity:  No epileptiform discharges.  Periodic/rhythmic activity:  None.  Events:  No electrographic seizures or significant clinical events.  Provocations:  Hyperventilation and Photic stimulation: was not performed.    Daily Summary:    No epileptiform activity and no significant clinical events occurred.  Unremarkable awake and sleep overnight EEG recording.      Hilary Costa MD

## 2021-08-29 NOTE — DISCHARGE NOTE PROVIDER - NSDCFUSCHEDAPPT_GEN_ALL_CORE_FT
REYES, DINORAH ; 08/31/2021 ; [facility] [address] REYES, DINORAH ; 09/07/2021 ; [facility] [address]

## 2021-08-29 NOTE — DISCHARGE NOTE PROVIDER - HOSPITAL COURSE
Patient is 40 yo F with past medical history of epilepsy followed by Dr. Alexander, admitted for presurgical evaluation and medication titration.    Problem List/Main Diagnoses (system-based):   1. Epilepsy  - multiple seizures recorded on EEG after holding Onfi, keppra and reducing lamictal to 50 mg BID. EEG showing possible left frontal onset however unable to definitively localize 2/2 suspected onset and rapid generalization   - restarted on keppra 1000 mg BID, clobazam 5 mg QHS, and lamotrigine 150 mg BID. Has been seizure free x24 hours.     2. Back pain  - acute lower back pain likely 2/2 multiple consecutive seizures   - XR L and T spine done    Inpatient treatment course: Keppra held and lamotrigine decreased to 50 mg to record seizure. Multiple seizures recorded however unable to localize. Medications restarted and pt without seizure. Plan to discharge for further presurgical evaluation work up with Dr. Brunner.    Medications changes: Clobazam 5 mg QHS, lamotrigine 150 mg BID, and keppra 1000 mg BID   Labs to be followed outpatient: n/a  Exam to be followed outpatient:    Patient is 42 yo F with past medical history of epilepsy followed by Dr. Alexander, admitted for presurgical evaluation and medication titration.    Problem List/Main Diagnoses (system-based):   1. Epilepsy  - multiple seizures recorded on EEG after holding Onfi, keppra and reducing lamictal to 50 mg BID. EEG showing possible left frontal onset however unable to definitively localize 2/2 suspected onset and rapid generalization   - restarted on keppra 1000 mg BID, clobazam 5 mg QHS, and lamotrigine 150 mg BID. Has been seizure free x24 hours.     2. Back pain  - acute lower back pain likely 2/2 multiple consecutive seizures   - XR L and T spine done; CT LS spine done without abnormalities   - DCd on flexeril/medrol pack/naproxen; likely MSK from seizure activity      Inpatient treatment course: Keppra held and lamotrigine decreased to 50 mg to record seizure. Multiple seizures recorded however unable to localize. Medications restarted and pt without seizure. Plan to discharge for further presurgical evaluation work up with Dr. Brunner.    Medications changes: Clobazam 5 mg QHS, lamotrigine 150 mg BID, and keppra 1000 mg BID   Labs to be followed outpatient: n/a  Exam to be followed outpatient:

## 2021-08-29 NOTE — DISCHARGE NOTE PROVIDER - NSDCMRMEDTOKEN_GEN_ALL_CORE_FT
cloBAZam 10 mg oral tablet: 10 milligram(s) orally once a day  Keppra 1000 mg oral tablet: 1 tab(s) orally 2 times a day  lamoTRIgine 100 mg oral tablet: 1 tab(s) orally 2 times a day  Melatonin 5 mg oral tablet: 1 tab(s) orally once a day (at bedtime)   cloBAZam: 5 milligram(s) orally once a day (at bedtime)  cyclobenzaprine 10 mg oral tablet: 1 tab(s) orally 3 times a day, As needed, Muscle Spasm  Keppra 1000 mg oral tablet: 1 tab(s) orally 2 times a day  lamoTRIgine 150 mg oral tablet: 1 tab(s) orally 2 times a day  Medrol Dosepak 4 mg oral tablet: 6 tab(s) orally once a day for first day; decrease by 1 tab each day for 6 days total   Melatonin 5 mg oral tablet: 1 tab(s) orally once a day (at bedtime)  naproxen 500 mg oral delayed release tablet: 1 tab(s) orally 2 times a day as needed for pain   cloBAZam: 5 milligram(s) orally once a day (at bedtime)  cyclobenzaprine 10 mg oral tablet: 1 tab(s) orally 3 times a day, As needed, Muscle Spasm  Keppra 1000 mg oral tablet: 1 tab(s) orally 2 times a day  lamoTRIgine 150 mg oral tablet: 1 tab(s) orally 2 times a day  Medrol Dosepak 4 mg oral tablet: 6 tab(s) orally once a day for first day; decrease by 1 tab each day for 6 days total   Melatonin 5 mg oral tablet: 1 tab(s) orally once a day (at bedtime)  naproxen 500 mg oral tablet: 1 tab(s) orally 2 times a day as needed for pain

## 2021-08-29 NOTE — PROGRESS NOTE ADULT - ASSESSMENT
This 41y old female, has a history of epilepsy with last seizure in july 19, 2021. She is on multiple AEDs. Her MRI showed Rt insular lesion   She is admitted to the EMU for vEEG monitoring. Had five recorded seizures yesterday however unable to definitively localized. Has been seizure free x24 hours.     Plan:  - Continue vEEG  - c/w Lamotrigine to 150mg bid  - c/w Keppra 1 g BID  - c/w Clobazam 5mg at bedtime   - Seizure and fall precautions   - Ativan 2mg IV for GTCs > 2 min  - XR L+T spine for lower back pain likely 2/2 from seizures yesterday. Tylenol prn for pain, Lidoderm patch ordered.

## 2021-08-29 NOTE — DISCHARGE NOTE PROVIDER - CARE PROVIDER_API CALL
Kadi Brunner (MD)  EEGEpilepsy; Neurology; Neurophysiology  [address] [facility], [address]  [place] [address]  Phone: [phone]  Fax: [phone]  Scheduled Appointment: 08/31/2021

## 2021-08-29 NOTE — DISCHARGE NOTE PROVIDER - NSDCCPCAREPLAN_GEN_ALL_CORE_FT
PRINCIPAL DISCHARGE DIAGNOSIS  Diagnosis: Seizures  Assessment and Plan of Treatment: You were admitted for presurgical evaluation and medication titration for your seizures. Unfortunately the vEEG was unable to definitively localize your seizures and therefore will continue to need further outpatient testing. Your anti-epileptic medications were adjusted. Please continue to take clobazam 5 mg once a day, keppra 1000 mg twice a day, and lamotrigine 150 mg twice a day.       PRINCIPAL DISCHARGE DIAGNOSIS  Diagnosis: Seizures  Assessment and Plan of Treatment: You were admitted for presurgical evaluation and medication titration for your seizures. Unfortunately the vEEG was unable to definitively localize your seizures and therefore will continue to need further outpatient testing. Your anti-epileptic medications were adjusted. Please continue to take clobazam 5 mg once a day, keppra 1000 mg twice a day, and lamotrigine 150 mg twice a day.      SECONDARY DISCHARGE DIAGNOSES  Diagnosis: Back pain  Assessment and Plan of Treatment: You had lower back pain as a result of your seizures. We got x rays and CT scans of your back which did not reveal any abnormalities. This is likely due to a muscle strain. We started you on a combination of naproxen, a muscle relaxant and a short course of oral steroids. If you have any acute loss of sensation in your legs or loss of bowel or urinary incontinence, please go to an ER immediately.     PRINCIPAL DISCHARGE DIAGNOSIS  Diagnosis: Seizures  Assessment and Plan of Treatment: You were admitted for presurgical evaluation and medication titration for your seizures. Unfortunately the vEEG was unable to definitively localize your seizures and therefore will continue to need further outpatient testing. Your anti-epileptic medications were adjusted. Please continue to take clobazam 5 mg once a day, keppra 1000 mg twice a day, and lamotrigine 150 mg twice a day.      SECONDARY DISCHARGE DIAGNOSES  Diagnosis: Back pain  Assessment and Plan of Treatment: You had lower back pain as a result of your seizures. We got x rays and CT scans of your back which did not reveal any abnormalities. This is likely due to a muscle strain. We started you on a combination of naproxen, a muscle relaxant and a short course of oral steroids. These medications were sent to our pharmacy downstairs. If you have any acute loss of sensation in your legs or loss of bowel or urinary incontinence, please go to an ER immediately.

## 2021-08-29 NOTE — PROGRESS NOTE ADULT - SUBJECTIVE AND OBJECTIVE BOX
OVERNIGHT EVENTS: CARLOS ENRIQUE    SUBJECTIVE / INTERVAL HPI: Patient seen and examined at bedside. Pt reports no events overnight, does complain of lower back pain, relieved with tylenol. She thinks it is related to her seizures, started after her seizures yesterday.      VITAL SIGNS:  Vital Signs Last 24 Hrs  T(C): 36.7 (29 Aug 2021 05:34), Max: 36.8 (28 Aug 2021 21:09)  T(F): 98 (29 Aug 2021 05:34), Max: 98.2 (28 Aug 2021 21:09)  HR: 75 (29 Aug 2021 05:34) (73 - 92)  BP: 127/68 (29 Aug 2021 05:34) (127/68 - 129/87)  BP(mean): --  RR: 18 (29 Aug 2021 05:34) (18 - 18)  SpO2: 95% (29 Aug 2021 05:34) (95% - 99%)    PHYSICAL EXAM:    General: WDWN, NAD, resting comfortably in bed  HEENT: NC/AT; anicteric sclera; MMM  Neck: supple  Cardiovascular: +S1/S2; RRR  Respiratory: CTA B/L; no W/R/R  Gastrointestinal: soft, NT/ND; +BSx4  Extremities: WWP; no edema, clubbing or cyanosis  Vascular: 2+ radial, DP/PT pulses B/L  Neurologic:     -Mental Status: AAOx3. Speech is fluent with intact naming, repetition, and comprehension, no dysarthria. Recent and remote memory intact. Follows commands. Attention/concentration intact. Fund of knowledge appropriate.     -Cranial Nerves:          II: Visual fields are full to confrontation.          III, IV, VI: EOMI without nystagmus. PERRL b/l          V:  Facial sensation V1-V3 equal and intact           VII: Face is symmetric with normal eye closure and smile          VIII: Hearing is bilaterally intact to finger rub          IX, X: Uvula is midline and soft palate rises symmetrically          XI: Head turning and shoulder shrug are intact.          XII: Tongue protrudes midline     -Motor: Normal bulk and tone. Strength bilateral upper extremity 5/5, bilateral lower extremities 5/5.                     No pronator drift. Rapid alternating movements intact and symmetric     -Sensation: Intact to light touch bilaterally.      -Coordination: No dysmetria on finger-to-nose    MEDICATIONS:  MEDICATIONS  (STANDING):  cloBAZam 5 milliGRAM(s) Oral at bedtime  lamoTRIgine 150 milliGRAM(s) Oral two times a day  levETIRAcetam 1000 milliGRAM(s) Oral every 12 hours  melatonin 5 milliGRAM(s) Oral at bedtime    MEDICATIONS  (PRN):  acetaminophen   Tablet .. 650 milliGRAM(s) Oral every 6 hours PRN Temp greater or equal to 38.5C (101.3F), Moderate Pain (4 - 6), Severe Pain (7 - 10)  ibuprofen  Tablet. 400 milliGRAM(s) Oral every 6 hours PRN Moderate Pain (4 - 6)  LORazepam   Injectable 2 milliGRAM(s) IV Push once PRN Seizure activity      ALLERGIES:  Allergies    Bactrim (Hives; Rash)    Intolerances        LABS:              CAPILLARY BLOOD GLUCOSE          RADIOLOGY & ADDITIONAL TESTS: Reviewed.   OVERNIGHT EVENTS: CARLOS ENRIQUE    SUBJECTIVE / INTERVAL HPI: Patient seen and examined at bedside. Pt reports no events overnight, does complain of lower back pain, relieved with tylenol. She thinks it is related to her seizures, started after her seizures yesterday.      VITAL SIGNS:  Vital Signs Last 24 Hrs  T(C): 36.7 (29 Aug 2021 05:34), Max: 36.8 (28 Aug 2021 21:09)  T(F): 98 (29 Aug 2021 05:34), Max: 98.2 (28 Aug 2021 21:09)  HR: 75 (29 Aug 2021 05:34) (73 - 92)  BP: 127/68 (29 Aug 2021 05:34) (127/68 - 129/87)  BP(mean): --  RR: 18 (29 Aug 2021 05:34) (18 - 18)  SpO2: 95% (29 Aug 2021 05:34) (95% - 99%)    PHYSICAL EXAM:    General: WDWN, NAD, resting comfortably in bed  HEENT: NC/AT; anicteric sclera; MMM; tongue lacerations b/l  Neck: supple  Cardiovascular: +S1/S2; RRR  Respiratory: CTA B/L; no W/R/R  Gastrointestinal: soft, NT/ND; +BSx4  Extremities: WWP; no edema, clubbing or cyanosis  Vascular: 2+ radial, DP/PT pulses B/L  Neurologic:     -Mental Status: AAOx3. Speech is fluent with intact naming, repetition, and comprehension, no dysarthria. Recent and remote memory intact. Follows commands. Attention/concentration intact. Fund of knowledge appropriate.     -Cranial Nerves:          II: Visual fields are full to confrontation.          III, IV, VI: EOMI without nystagmus. PERRL b/l          V:  Facial sensation V1-V3 equal and intact           VII: Face is symmetric with normal eye closure and smile          VIII: Hearing is bilaterally intact to finger rub          IX, X: Uvula is midline and soft palate rises symmetrically          XI: Head turning and shoulder shrug are intact.          XII: Tongue protrudes midline     -Motor: Normal bulk and tone. Strength bilateral upper extremity 5/5, bilateral lower extremities 5/5.                     No pronator drift. Rapid alternating movements intact and symmetric     -Sensation: Intact to light touch bilaterally.      -Coordination: No dysmetria on finger-to-nose    MEDICATIONS:  MEDICATIONS  (STANDING):  cloBAZam 5 milliGRAM(s) Oral at bedtime  lamoTRIgine 150 milliGRAM(s) Oral two times a day  levETIRAcetam 1000 milliGRAM(s) Oral every 12 hours  melatonin 5 milliGRAM(s) Oral at bedtime    MEDICATIONS  (PRN):  acetaminophen   Tablet .. 650 milliGRAM(s) Oral every 6 hours PRN Temp greater or equal to 38.5C (101.3F), Moderate Pain (4 - 6), Severe Pain (7 - 10)  ibuprofen  Tablet. 400 milliGRAM(s) Oral every 6 hours PRN Moderate Pain (4 - 6)  LORazepam   Injectable 2 milliGRAM(s) IV Push once PRN Seizure activity      ALLERGIES:  Allergies    Bactrim (Hives; Rash)    Intolerances        LABS:              CAPILLARY BLOOD GLUCOSE          RADIOLOGY & ADDITIONAL TESTS: Reviewed.

## 2021-08-30 ENCOUNTER — TRANSCRIPTION ENCOUNTER (OUTPATIENT)
Age: 41
End: 2021-08-30

## 2021-08-30 PROCEDURE — 99233 SBSQ HOSP IP/OBS HIGH 50: CPT

## 2021-08-30 PROCEDURE — 95720 EEG PHY/QHP EA INCR W/VEEG: CPT

## 2021-08-30 RX ORDER — OXYCODONE HYDROCHLORIDE 5 MG/1
5 TABLET ORAL ONCE
Refills: 0 | Status: DISCONTINUED | OUTPATIENT
Start: 2021-08-30 | End: 2021-08-30

## 2021-08-30 RX ORDER — KETOROLAC TROMETHAMINE 30 MG/ML
30 SYRINGE (ML) INJECTION ONCE
Refills: 0 | Status: DISCONTINUED | OUTPATIENT
Start: 2021-08-30 | End: 2021-08-30

## 2021-08-30 RX ORDER — CYCLOBENZAPRINE HYDROCHLORIDE 10 MG/1
10 TABLET, FILM COATED ORAL ONCE
Refills: 0 | Status: COMPLETED | OUTPATIENT
Start: 2021-08-30 | End: 2021-08-30

## 2021-08-30 RX ADMIN — LEVETIRACETAM 1000 MILLIGRAM(S): 250 TABLET, FILM COATED ORAL at 22:23

## 2021-08-30 RX ADMIN — Medication 5 MILLIGRAM(S): at 22:23

## 2021-08-30 RX ADMIN — OXYCODONE HYDROCHLORIDE 5 MILLIGRAM(S): 5 TABLET ORAL at 11:25

## 2021-08-30 RX ADMIN — LEVETIRACETAM 1000 MILLIGRAM(S): 250 TABLET, FILM COATED ORAL at 09:40

## 2021-08-30 RX ADMIN — LIDOCAINE 1 PATCH: 4 CREAM TOPICAL at 05:27

## 2021-08-30 RX ADMIN — Medication 30 MILLIGRAM(S): at 16:17

## 2021-08-30 RX ADMIN — LAMOTRIGINE 150 MILLIGRAM(S): 25 TABLET, ORALLY DISINTEGRATING ORAL at 22:24

## 2021-08-30 RX ADMIN — CYCLOBENZAPRINE HYDROCHLORIDE 10 MILLIGRAM(S): 10 TABLET, FILM COATED ORAL at 15:53

## 2021-08-30 RX ADMIN — CLOBAZAM 5 MILLIGRAM(S): 10 TABLET ORAL at 22:22

## 2021-08-30 RX ADMIN — LAMOTRIGINE 150 MILLIGRAM(S): 25 TABLET, ORALLY DISINTEGRATING ORAL at 05:22

## 2021-08-30 RX ADMIN — OXYCODONE HYDROCHLORIDE 5 MILLIGRAM(S): 5 TABLET ORAL at 12:08

## 2021-08-30 NOTE — DISCHARGE NOTE NURSING/CASE MANAGEMENT/SOCIAL WORK - NSDCPEFALRISK_GEN_ALL_CORE
For information on Fall & injury Prevention, visit [email or web address]

## 2021-08-30 NOTE — PROGRESS NOTE ADULT - ASSESSMENT
This 41y old female, has a history of epilepsy with last seizure in july 19, 2021. She is on multiple AEDs. Her MRI showed Rt insular lesion   She is admitted to the EMU for vEEG monitoring.     Plan:  - c/w Lamotrigine to 150mg bid  - c/w Keppra 1 g BID  - c/w Clobazam 5mg at bedtime   - Seizure and fall precautions   - Ativan 2mg IV for GTCs > 2 min  - XR L+T spine without acute abnormalities; pain control PRN (oxy/flexeril/trial of NSAIDs)     Dispo: Home pending pain control for back pain

## 2021-08-30 NOTE — PROGRESS NOTE ADULT - SUBJECTIVE AND OBJECTIVE BOX
OVERNIGHT EVENTS: CARLOS ENRIQUE    SUBJECTIVE / INTERVAL HPI: Patient seen and examined at bedside. Pt reports no events overnight, does complain of lower back pain, worsening and debilitating per patient. Tylenol previously worked, but now getting worse.     PHYSICAL EXAM:  General: WDWN, NAD, resting comfortably in bed  HEENT: NC/AT; anicteric sclera; MMM; tongue lacerations b/l  Neck: supple  Cardiovascular: +S1/S2; RRR  Respiratory: CTA B/L; no W/R/R  Gastrointestinal: soft, NT/ND; +BSx4  Back: non tender, no bony tenderness or step offs   Extremities: WWP; no edema, clubbing or cyanosis  Vascular: 2+ radial, DP/PT pulses B/L  Neurologic:     -Mental Status: AAOx3. Speech is fluent with intact naming, repetition, and comprehension, no dysarthria. Recent and remote memory intact. Follows commands. Attention/concentration intact. Fund of knowledge appropriate.     -Cranial Nerves:          II: Visual fields are full to confrontation.          III, IV, VI: EOMI without nystagmus. PERRL b/l          V:  Facial sensation V1-V3 equal and intact           VII: Face is symmetric with normal eye closure and smile          VIII: Hearing is bilaterally intact to finger rub          IX, X: Uvula is midline and soft palate rises symmetrically          XI: Head turning and shoulder shrug are intact.          XII: Tongue protrudes midline     -Motor: Normal bulk and tone. Strength bilateral upper extremity 5/5, bilateral lower extremities 5/5.                     No pronator drift. Rapid alternating movements intact and symmetric     -Sensation: Intact to light touch bilaterally.      -Coordination: No dysmetria on finger-to-nose    VITAL SIGNS:  Vital Signs Last 24 Hrs  T(C): 36.7 (30 Aug 2021 11:57), Max: 36.7 (30 Aug 2021 11:57)  T(F): 98 (30 Aug 2021 11:57), Max: 98 (30 Aug 2021 11:57)  HR: 75 (30 Aug 2021 11:57) (69 - 80)  BP: 100/66 (30 Aug 2021 11:57) (94/51 - 100/66)  BP(mean): --  RR: 18 (30 Aug 2021 11:57) (17 - 18)  SpO2: 98% (30 Aug 2021 11:57) (96% - 99%)      MEDICATIONS:  MEDICATIONS  (STANDING):  cloBAZam 5 milliGRAM(s) Oral at bedtime  lamoTRIgine 150 milliGRAM(s) Oral two times a day  levETIRAcetam 1000 milliGRAM(s) Oral every 12 hours  lidocaine   4% Patch 1 Patch Transdermal every 24 hours  melatonin 5 milliGRAM(s) Oral at bedtime    MEDICATIONS  (PRN):  acetaminophen   Tablet .. 650 milliGRAM(s) Oral every 6 hours PRN Temp greater or equal to 38.5C (101.3F), Moderate Pain (4 - 6), Severe Pain (7 - 10)  ibuprofen  Tablet. 400 milliGRAM(s) Oral every 6 hours PRN Moderate Pain (4 - 6)  LORazepam   Injectable 2 milliGRAM(s) IV Push once PRN Seizure activity      ALLERGIES:  Allergies    Bactrim (Hives; Rash)    Intolerances        LABS:              CAPILLARY BLOOD GLUCOSE          RADIOLOGY & ADDITIONAL TESTS: Reviewed.

## 2021-08-30 NOTE — DISCHARGE NOTE NURSING/CASE MANAGEMENT/SOCIAL WORK - PATIENT PORTAL LINK FT
You can access the FollowMyHealth Patient Portal offered by [facility] by registering at the following website: [email or web address]. By joining [employer]’s FollowMyHealth portal, you will also be able to view your health information using other applications (apps) compatible with our system.

## 2021-08-31 ENCOUNTER — APPOINTMENT (OUTPATIENT)
Dept: NEUROLOGY | Facility: CLINIC | Age: 41
End: 2021-08-31

## 2021-08-31 ENCOUNTER — RX RENEWAL (OUTPATIENT)
Age: 41
End: 2021-08-31

## 2021-08-31 VITALS
DIASTOLIC BLOOD PRESSURE: 71 MMHG | OXYGEN SATURATION: 100 % | TEMPERATURE: 98 F | HEART RATE: 78 BPM | SYSTOLIC BLOOD PRESSURE: 108 MMHG | RESPIRATION RATE: 16 BRPM

## 2021-08-31 PROBLEM — Z78.9 OTHER SPECIFIED HEALTH STATUS: Chronic | Status: ACTIVE | Noted: 2021-08-24

## 2021-08-31 PROCEDURE — 80053 COMPREHEN METABOLIC PANEL: CPT

## 2021-08-31 PROCEDURE — 72070 X-RAY EXAM THORAC SPINE 2VWS: CPT

## 2021-08-31 PROCEDURE — 80061 LIPID PANEL: CPT

## 2021-08-31 PROCEDURE — 84100 ASSAY OF PHOSPHORUS: CPT

## 2021-08-31 PROCEDURE — 95716 VEEG EA 12-26HR CONT MNTR: CPT

## 2021-08-31 PROCEDURE — 80175 DRUG SCREEN QUAN LAMOTRIGINE: CPT

## 2021-08-31 PROCEDURE — 36415 COLL VENOUS BLD VENIPUNCTURE: CPT

## 2021-08-31 PROCEDURE — 80307 DRUG TEST PRSMV CHEM ANLYZR: CPT

## 2021-08-31 PROCEDURE — 80339 ANTIEPILEPTICS NOS 1-3: CPT

## 2021-08-31 PROCEDURE — 72131 CT LUMBAR SPINE W/O DYE: CPT

## 2021-08-31 PROCEDURE — 80177 DRUG SCRN QUAN LEVETIRACETAM: CPT

## 2021-08-31 PROCEDURE — 83605 ASSAY OF LACTIC ACID: CPT

## 2021-08-31 PROCEDURE — 84702 CHORIONIC GONADOTROPIN TEST: CPT

## 2021-08-31 PROCEDURE — 85027 COMPLETE CBC AUTOMATED: CPT

## 2021-08-31 PROCEDURE — 72100 X-RAY EXAM L-S SPINE 2/3 VWS: CPT

## 2021-08-31 PROCEDURE — 72131 CT LUMBAR SPINE W/O DYE: CPT | Mod: 26

## 2021-08-31 PROCEDURE — 83735 ASSAY OF MAGNESIUM: CPT

## 2021-08-31 PROCEDURE — 99239 HOSP IP/OBS DSCHRG MGMT >30: CPT

## 2021-08-31 PROCEDURE — 86769 SARS-COV-2 COVID-19 ANTIBODY: CPT

## 2021-08-31 PROCEDURE — 95713 VEEG 2-12 HR CONT MNTR: CPT

## 2021-08-31 PROCEDURE — 95700 EEG CONT REC W/VID EEG TECH: CPT

## 2021-08-31 RX ORDER — CLOBAZAM 10 MG/1
10 TABLET ORAL
Qty: 0 | Refills: 0 | DISCHARGE

## 2021-08-31 RX ORDER — CYCLOBENZAPRINE HYDROCHLORIDE 10 MG/1
1 TABLET, FILM COATED ORAL
Qty: 15 | Refills: 0
Start: 2021-08-31 | End: 2021-09-04

## 2021-08-31 RX ORDER — CYCLOBENZAPRINE HYDROCHLORIDE 10 MG/1
10 TABLET, FILM COATED ORAL THREE TIMES A DAY
Refills: 0 | Status: DISCONTINUED | OUTPATIENT
Start: 2021-08-31 | End: 2021-08-31

## 2021-08-31 RX ADMIN — CYCLOBENZAPRINE HYDROCHLORIDE 10 MILLIGRAM(S): 10 TABLET, FILM COATED ORAL at 09:17

## 2021-08-31 RX ADMIN — LEVETIRACETAM 1000 MILLIGRAM(S): 250 TABLET, FILM COATED ORAL at 09:16

## 2021-08-31 RX ADMIN — LAMOTRIGINE 150 MILLIGRAM(S): 25 TABLET, ORALLY DISINTEGRATING ORAL at 07:22

## 2021-09-02 DIAGNOSIS — M54.5 LOW BACK PAIN: ICD-10-CM

## 2021-09-02 DIAGNOSIS — Z88.1 ALLERGY STATUS TO OTHER ANTIBIOTIC AGENTS STATUS: ICD-10-CM

## 2021-09-02 DIAGNOSIS — G93.9 DISORDER OF BRAIN, UNSPECIFIED: ICD-10-CM

## 2021-09-02 DIAGNOSIS — G40.909 EPILEPSY, UNSPECIFIED, NOT INTRACTABLE, WITHOUT STATUS EPILEPTICUS: ICD-10-CM

## 2021-09-02 LAB
CLOBAZAM + NOR PNL SERPL: 121 NG/ML — SIGNIFICANT CHANGE UP (ref 30–300)
DESMETHYLCLOBAZAM: 425 NG/ML — SIGNIFICANT CHANGE UP (ref 300–3000)

## 2021-09-07 ENCOUNTER — APPOINTMENT (OUTPATIENT)
Dept: NEUROLOGY | Facility: CLINIC | Age: 41
End: 2021-09-07
Payer: MEDICAID

## 2021-09-07 VITALS
HEART RATE: 75 BPM | SYSTOLIC BLOOD PRESSURE: 155 MMHG | HEIGHT: 62 IN | BODY MASS INDEX: 27.6 KG/M2 | DIASTOLIC BLOOD PRESSURE: 91 MMHG | WEIGHT: 150 LBS | TEMPERATURE: 97.5 F

## 2021-09-07 PROCEDURE — 99215 OFFICE O/P EST HI 40 MIN: CPT

## 2021-09-07 RX ORDER — CLOBAZAM 10 MG/1
10 TABLET ORAL
Qty: 30 | Refills: 0 | Status: DISCONTINUED | COMMUNITY
Start: 2021-07-12 | End: 2021-09-07

## 2021-09-07 RX ORDER — CLOBAZAM 10 MG/1
10 TABLET ORAL
Qty: 30 | Refills: 0 | Status: DISCONTINUED | COMMUNITY
Start: 2021-08-04 | End: 2021-09-07

## 2021-09-07 RX ORDER — CLOBAZAM 10 MG/1
10 TABLET ORAL
Qty: 30 | Refills: 0 | Status: DISCONTINUED | COMMUNITY
Start: 2021-08-10 | End: 2021-09-07

## 2021-09-07 RX ORDER — CLOBAZAM 10 MG/1
10 TABLET ORAL
Qty: 45 | Refills: 0 | Status: DISCONTINUED | COMMUNITY
Start: 2021-05-22 | End: 2021-09-07

## 2021-09-09 ENCOUNTER — RX RENEWAL (OUTPATIENT)
Age: 41
End: 2021-09-09

## 2021-09-09 NOTE — ASSESSMENT
[FreeTextEntry1] : \par ambien okay for sleep \par lamotrigine 150 mg twice a day \par clobazam 5 mg qhs \par levetiracetam 1 g bid \par tizanadine 2 mg every 8 hours \par naproxen \par please get labs \par No driving \par \par Discussed seizure safety - sleep on back - avoid prone position.  sleeps with her each night. \par RTC - please bring daughter to appointment \par ___________________________\par \par

## 2021-09-09 NOTE — PHYSICAL EXAM
[FreeTextEntry1] : Mental Status: AxOx3, speech: no dysarthria, slightly dysthymic and tearful \par CN: visual acuity, VFF, blink to confrontation \par III, IV, VI, PERRL, EOMI \par V sensation normal to light touch\par VII normal squint vs resistance, normal smile, face symmetric \par VIII: normal hearing \par IX, X normal gag, symmetric palate, uvula raises midline \par XI normal shrug versus resistance and lateralization of head versus resistance \par XII tongue symmetric, normal strength, no tremor or fasciculation \par Motor: full strength \par Sensory: normal to LT \par Reflexes \par Brachioradialis, biceps, triceps, patella and ankles 2+ \par Plantar flexor response bilaterally \par Coordination: no dysmetria on FNF\par Gait : normal balance and gait\par

## 2021-09-09 NOTE — DATA REVIEWED
[de-identified] : 2/10/21: MRI brain with and without contrast: T2 nonenhancing non masslike hyperintense focus, centered within the anterior right insula corresponding to findings seen on recently performed CT head, nonspecific. Possible diagnostic consideration include prominent focus of gliosis from prior ischemia versus postinfectious/post inflammatory process or post ictal focus. Low - grade glial neoplasm is considered unlikely given morphology and lack of mass effect and enhancement but not entirely excluded. Follow up suggested. No evidence of mesial temporal sclerosis.  [de-identified] : aEEG : 3/19-3/20/21: rare bilateral anterior temporal delta slowing suggestive of mild focal dysfunction in these regions. Low amplitude EEG. \par EMU: 8/24/29 - [facility] : 5 clinical seizures of broad onset, possible left frontal with rapid bisynchrony. rare left frontal spikes, maximal at F3 > right frontal spikes. Fp2.  [de-identified] : 2/10/21: Creatinine 0.8, AST ALT Alk phos within normal limits \par 6/2021: LEV level 6.5, vitamin D level 25.3 low , clobazam level 250, \par 7/18/21: lamotrigine level 3.8 (2-20), levetiracetam level 37.1 \par 7/18/21: creatinine level 0.7

## 2021-09-09 NOTE — HISTORY OF PRESENT ILLNESS
[FreeTextEntry1] : Presented to clinic with  and daughter. EMU admission at [facility] August 24-29. She had five seizures after levetiracetam was stopped and lamotrigine was halved. Seizures were focal to bilateral tonic clonic. \par Unfortunately all seizures had broad onset without definite right or left onset. There was rapid bisynchrony. Seizures lasted ~1 minute and occurred out of wake and sleep. There was diffuse EEG suppression at offset. \par \par Clinically seizures consisted of sudden forceful head turn to right and clonic movements, perhaps suggestive of left frontal onset. Then left arm extended out, suggestive of right SMA propagation. \par \par Seizures were variably more developed on the left or right frontotemporal region at offset.\par \par Discussed these findings with Gertrude. She was very depressed about this and the number of medications she has to take. 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 \par \par While she was at [facility], her lamotrigine was increased to 150 mg bid. She is also compliant with 1 g LEV bid and clobazam 5 mg qd. \par \par She is stressed out about finances. Very tearful and hopeless. Interested in medical marijuana. \par \par Reports significant back pain and neck pain from seizures at [facility]. Was given flexeril and naproxen. \par \par ________________________________________________\par \par 8/3/21 \par Presenting to clinic with her  for a follow-up appointment. Last seen in clinic on June 14, 2021. She had another seizure on June 16th, 2021. \par She had another seizure requiring an ED visit on Sunday 7/18/21. Her  witnessed the event. Seizure occurred at 5:55 am. She almost fell off the bad but he was able to catch her. It lasted ~40 seconds with short post-ictal period. She had a sensatoin in right leg that spread. \par \par She reports pain in her left arm but not chest pain. She reports compliance with AEDs. \par \par She reprots feeling very frustrated regarding breakthrough seizures. She has a lot of stresses including other medical problems. Feels overwhelmed. Told her she may need to take time off work but she was concerned regarding finances. Discussed that if she continues to have seizures, I would like her to get a second opinion at [facility] and be admitted there for extended EMU monitoring. IN the meantime, will continue to go up on lamotrigine wit plan to taper clobazam or levetiracetam when she is at a therapeutic level of lamotrigine. She was taking second dose of lamotrigine in the early evening. Advised her to take it at night. \par \par ______________\par \par Presenting for follow-up. Last seen in clinic on 5/25/21. Reports racing thoughts, intense anxiety. This keeps her up at night. She reports that taking clobazam 15 mg qd makes her feel worse so she has decreased back down \par to 10 mg qdaily. She had difficulty concentrating on a higher dose of clobazam. She is taking levetiracetam 500 mg twice a day. She also takes vitamin d and magnesium to help her sleep. She also takes valerian root. \par \par She requested a therapist from work because her mood swings, she feels like she is bipolar, are causing tension with her  and family. She enjoys working. She likes to keep busy. Last seizure - \par convulsive, witnessed seizure out of sleep, witnessed by  was 5/22/1. \par \par She no longer has IUD. She is aware of risk of birth defects with clobazam. Her  and \par her are refraining from sexual activity. \par \par __________________\par 5/25/21 \par Doing okay. Had breakthrough seizure in setting of compliance with medications on 5/22/21. Gertrude has little memory but thinks she may have felt electricity on the left side of her body from her head to her toes.  She then made a loud sound and woke up her partner who was sleeping next to her. He saw shaking and foaming at the mouth. It lasted for 30 seconds. She bit her tongue. She had urgency for a bowel movement after the event. \par \par She was compliant with levetiracetam 500 mg bid and clobazam 10 mg . She does not think she missed doses. \par She is not driving. She is working. She is able to walk to work.  She takes care of her three children. Her dose of clobazam was subsequently increased to 15 mg qhs. \par \par Other than that , she is doing okay. Her partner, who is present at bedside states that she appears more calm, less anxious. Her mood is better overall. \par \par She saw Dr. Tejada yesterday with plan for follow-up with him in one year to review the results. \par _________________________________________________\par Last seen in clinic on 3/12/21. Doing well. Feels more calm. Getting better sleep. Less angry outbursts. No interval clinical events concerning for seizure. Last seizure out of sleep witnessed by  was on 3/20/21. She was shaking from side to side. She was in a "vegetative state" for a few minutes afterwards. Felt confused afterwards. \par \par Sexually active. Now with IUD. Discussed risks of clobazam to a fetus. Patient and  are devout catholics and therefore are considering practicing abstinence. Thinks that current IUD is causing rash, BV. Considering taking it out. \par \par Tolerating medications well. No side-effects.  \par \par Reports high cervical anterior lump, not clear what this is. Painful to touch.\par \par Medications:\par  \par levetiracetam 500 mg bid \par clobazam 10 mg qhs \par ___________\par \par \par 3/22/21 \par Last seen in clinic on 2/12/21. Doing well. No clinical events concerning for seizure. Having a lot of stressful interactions with family members, particularly mother. She has a lot of anger when she comes home from work and the house is a mess. \par \par Otherwise tolerating the levetiracetam okay. She has [employer] seizure detection watch. Therefore her  can know if she is having seizures - she has nocturnal seizures and sometimes he doesn't wake up. \par They did not want to start clobazam due to concern for side-effects.  \par \par Met with Dr. Tejada. Will do surveillance imaging for right insular lesion, low suspicion for malignancy. \par ______________\par Dinorah Reyes is a 40-year-old right-handed lady with a past medical history of four clinical events concerning for seizure. She is presenting after hospital follow-up for a seizure. \par \par As per her report, her first seizure was in [place] in 2013. She was 33-years old at the time.  She felt tired and fell asleep. Woke up making sudden involuntary movements with her hands, R>L. She could not control the movements. She bit her tongue. No drooling or difficulty breathing. She was aware of what was happening but had no control. She fell asleep and awoke again and her tongue was bruised. She went to [facility] in [facility] and was told it may have been a panic attack. She was sent home. Prior to the event, she had no illness. \par \par Second event was out of sleep in 2019, six years later. Her  was sleeping. She awoke out of sleep and had involuntary movements, maybe of right hand greater than left with mouth movements. The event lasted 15-20 seconds. No loss of consciousness, no loss of urine, bit both sides of tongue. She was evaluated by [facility] and was told t his may be a panic attack. \par \par Third seizure was on 1/31/21 around 6 :20 AM.  awoke and witnessed whole body convulsions and foaming at the mouth. He turned her onto her right side more than left. The event lasted 20-30 seconds and then stopped. She was breathing heavily and had lost consciousness. She felt sore afterwards. \par \par The last seizure was on 2/10/21. She was brought to [facility] ED> She was postictal and unable to provide detailed history. As per chart notes,  ED called  who reported she was having generalized tonic clonic seizure with foaming and the mouth and bit her tongue. Seizure lasted a few minutes. Upon arrival to ED, BP was 165/82. O2 96%. She was afebrile and tachycardic at 110. She had tongue bite on right side with edema. No sign of infection. MRI brain showed T2 nonenhancing hyperintense focus within the anterior right insula, unclear etiology. She was discharged on  mg bid. \par \par \par Seizure risk factors: \par No history of brain infection, no family history of epilepsy, no history of meningitis, encephalitis. No history of learning problems. Met developmental milestones okay. No history of episode of head injury with loss of consciousness. Did have episode of high fever, was hallucinating and had visual hallucinations in setting of delirium in the past. \par \par Past Medical History:\par Anemia\par \par Surgical History \par 1/2015: ovarian cyst, torsion\par \par Allergies: Bactrim \par \par Social History: \par Lives with kids and  in [place], children are 5, 12, and 15 years of age \par Caregiver \par 10th grade education\par No drugs, rare alcohol \par \par Family History: \par Mother – 77: htn, DM \par Father – smoker \par Brother – open heart surgery \par

## 2021-09-09 NOTE — DISCUSSION/SUMMARY
[FreeTextEntry1] : Gertrude is a pleasant 40-year-old lady with history of epilepsy. First seizure was at age 33, with second seizure years later. Dr. Tejada for right insula lesion, doubt neoplastic, will have surveillance imaging follow-up within one year - 6/2022. \par \par Seizures appear to be focal to bilateral tonic clonic seizures.  Occur out of sleep.\par \par Has had ~ 7 lifetime seizures. Most recent seizure 7/18/21 out of sleep. Prior to seizure, she may get a rush of tingling (insular) - this is followed by a generalized brief seizure with no significant post-ictal confusion. Seizures are occurring despite increase in medication. Has been started on klonopin and seizures happen despite this. \par \par  Has right insular hyperintense focus, unclear etiology, may be post-infectious. Appears to have seizures with variable propagation. Possible she has prorogation anteriorly to the frontal lobe with focal to bilateral tonic clonic seizure. Left-sided tingling, electric like sensation may point to insular involvement. \par \par Ambulatory EEG 3/2021 with rare bilateral anterior temporal delta slowing in drowsiness, no epileptiform potential or seizures. \par \par Continues to have significant difficulties with sleep. Cannot tolerate increasing clobazam. Has significant anxiety, ruminating thoughts as well. Scheduling an appointment with a therapist. Reports sometimes feeling bipolar with mood. \par \par With convulsive seizures, she is at risk for sudep. She is becoming refractory to medications. \par \par She had admission to [facility] 8/24/21. 5 bilateral tonic clonic seizures out of wake and sleep when levetiracetam was lowered. Seizures consisted of abrupt right head turn followed by flexion of both arms with clonic movements - then left arm extension suggestive of right SMA involvement. \par \par Seizures appeared unforunately to be likely frontal with rapid bisynchrony. Lasted one minute. \par \par Post ictal suppression -- this pattern associated with SUDEP. \par \par Risk for SUDEP --> GTC, nocturnal, may be refractory --> may be a surgical candidate - bilateral frontotemporal regions including right insula followed by RNS or DBS.  Will need PET. Neuropsych testing -\par then will present in surgical conference. \par \par Clinically, seizures all with similar semiology.  Right head turn (appears forceful) with clonic movements --> followed by left arm extension may suggest left frontal onset with rapid propagation to right SMA. However, she has right insular lesion. \par \par Provided support to patient - very depressed, provided support. \par \par Medication titration: increase lamotrigine gradually to 200 mg bid - then taper clobazam, doesn't help her, then may consider decreasing LEV due to depression. \par Ambien okay for now - given that lack of sleep triggers her seizures. Medical marijuana okay - patient enquired. \par \par \par ___________________________\par \par \par

## 2021-10-07 ENCOUNTER — FORM ENCOUNTER (OUTPATIENT)
Age: 41
End: 2021-10-07

## 2021-10-20 ENCOUNTER — APPOINTMENT (OUTPATIENT)
Dept: NEUROLOGY | Facility: CLINIC | Age: 41
End: 2021-10-20
Payer: MEDICAID

## 2021-10-20 VITALS
WEIGHT: 150 LBS | HEIGHT: 62 IN | TEMPERATURE: 97.9 F | SYSTOLIC BLOOD PRESSURE: 131 MMHG | DIASTOLIC BLOOD PRESSURE: 78 MMHG | HEART RATE: 77 BPM | BODY MASS INDEX: 27.6 KG/M2

## 2021-10-20 PROCEDURE — 99215 OFFICE O/P EST HI 40 MIN: CPT

## 2021-10-21 NOTE — ASSESSMENT
[FreeTextEntry1] : Plan: \par LEV 1 g twice a day \par continue lamotrigine twice daily: 200 mg bid\par next week , clobazam will be 5 mg every other day \par Week 4 : stop clobazam , increase lamotrigine to 250 mg in the morning and 200 mg in the evening \par Week 5: increase lamotrigine to 250 mg twice a day \par Week 6: lamotrigine and levetiracetam level \par We will consider going down on levetiracetam \par klonopin 1 mg as needed for cluster of seizures or for small seizure \par consider medical marijuana as needed for headache and pain\par headache - ibuprofen 400 mg as needed qdaily , drink adequate water, stay hydrated \par Follow-up with Dr. Portillo \par \par Discussed seizure safety. No driving. \par ___________________________________

## 2021-10-21 NOTE — PHYSICAL EXAM
[FreeTextEntry1] : Mental Status: AxOx3, speech: no dysarthria,euthymic mood \par CN: visual acuity, \par III, IV, VI, PERRL, EOMI \par V sensation normal to light touch\par VII normal squint vs resistance, normal smile, face symmetric \par VIII: normal hearing \par IX, X normal gag, symmetric palate, uvula raises midline \par XI normal shrug versus resistance and lateralization of head versus resistance \par XII tongue symmetric, normal strength, no tremor or fasciculation \par Motor: full strength \par Gait : normal balance and gait\par

## 2021-10-21 NOTE — REASON FOR VISIT
[Follow-Up: _____] : a [unfilled] follow-up visit [Spouse] : spouse [Family Member] : family member [FreeTextEntry1] : Pt states for 2 weeks she has been waking up every morning with a headache.  states he believes she had a partial seizure 1 week ago.

## 2021-10-21 NOTE — DISCUSSION/SUMMARY
[FreeTextEntry1] : Gertrude is a pleasant 41-year-old lady with history of epilepsy. First seizure was at age 33, with second seizure years later. Saw Dr. Tejada for right insula lesion, doubt neoplastic, will have surveillance imaging follow-up within one year - 6/2022. \par \par Seizures appear to be focal to bilateral tonic clonic seizures. Occur out of sleep.\par \par Has had ~ 7 lifetime seizures. Most recent seizure 7/18/21 out of sleep. Prior to seizure, she may get a rush of tingling (insular) - this is followed by a generalized brief seizure with no significant post-ictal confusion. Seizures are occurring despite increase in medication. \par \par  Has right insular hyperintense focus, unclear etiology, may be post-infectious. Appears to have seizures with variable propagation. Possible she has prorogation anteriorly to the frontal lobe with focal to bilateral tonic clonic seizure. Left-sided tingling, electric like sensation may point to insular involvement. \par \par Ambulatory EEG 3/2021 with rare bilateral anterior temporal delta slowing in drowsiness, no epileptiform potential or seizures. \par \par She had admission to [facility] 8/24/21. 5 bilateral tonic clonic seizures out of wake and sleep when levetiracetam was lowered. Seizures consisted of abrupt right head turn followed by flexion of both arms with clonic movements - then left arm extension suggestive of right SMA involvement. \par \par Seizures were not well localized. Lateralization unclear. Appeared  to be likely frontal with rapid bisynchrony. Lasted one minute.  may be surgical candidate if seizures not well controlled - would need phase III intracranial study with coverage of  bilateral frontotemporal regions including right insula followed by RNS or DBS. DBS may not be optimal given psychiatric comorbidity. Will need PET. Neuropsych testing -\par then will present in surgical conference. \par \par Clinically, seizures all with similar semiology. Right head turn (appears forceful) with clonic movements --> followed by left arm extension may suggest left frontal onset with rapid propagation to right SMA. However, she has right insular lesion. \par \par Doing well now with no seizures for > 1 month with only small aura. Feeling good on lamotrigine. Sleep is good. Still with right-sided headaches. Will up-titrate lamotrigine, continue levetiracetam at current dose for now and stop clobazam gradually. Appears that levetiracetam may be most efficacious for seizures and that lamotrigine is helping as well. Lamotrigine is also helping her mood. \par \par ____

## 2021-10-21 NOTE — HISTORY OF PRESENT ILLNESS
[FreeTextEntry1] : Presents today with three daughters and . Oldest daughter is in tenth grade. \par Had a family meeting today. Oldest daughter tearful. Conflict regarding mom and her moods,\par yelling. Discussed that sometimes stress and seizures can change mood and that sometimes\par mom feels scared as seizures cause pain and she loses consciousness/awareness. \par \par Discussed importance of helping around the house and not adding to stress during times \par she has seizure clusters. Discussed that she is doing well now. Discussed with mom that \par she needs to be more gentle and also be a mother and caring for her daughters who \par are also suffering. Discussed that when mom is sick , everyone suffers. \par \par Discussed possibility of formal family therapy if things don't improve. \par \par Overall Gertrude is doing well. No big convulsive seizures since [facility] admission (when \par medications were taken down and she seized). Discussed that from that admission \par we realize that levetiracetam likely plays a big role in seizure freedom. She is sleeping \par well now without ambien or klonopin. She is getting a prescription for medical marijuana. \par She did have an aura a few weeks ago consisting of electric like sensation in both legs that \par woke her up from sleep. Likely this was a small seizure that did not progress. Instructed \par her to take klonopin 1 mg when she has those episodes. \par \par Her nephew is visiting and that is improving her mood. She is back to work part time. Has right-sided \par headaches. Second opinion with Dr. oPrtillo is coming up. \par \par Current medication regimen: \par lamotrigine 200 mg bid \par levetiracetam 1 g bid \par clobazam 5 qhs \par _________________\par 9/7/21 \par Presented to clinic with  and daughter. EMU admission at [facility] August 24-29. She had five seizures after levetiracetam was stopped and lamotrigine was halved. Seizures were focal to bilateral tonic clonic. \par Unfortunately all seizures had broad onset without definite right or left onset. There was rapid bisynchrony. Seizures lasted ~1 minute and occurred out of wake and sleep. There was diffuse EEG suppression at offset. \par \par Clinically seizures consisted of sudden forceful head turn to right and clonic movements, perhaps suggestive of left frontal onset. Then left arm extended out, suggestive of right SMA propagation. \par \par Seizures were variably more developed on the left or right frontotemporal region at offset.\par \par Discussed these findings with Gertrude. She was very depressed about this and the number of medications she has to take. 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 \par \par While she was at [place], her lamotrigine was increased to 150 mg bid. She is also compliant with 1 g LEV bid and clobazam 5 mg qd. \par \par She is stressed out about finances. Very tearful and hopeless. Interested in medical marijuana. \par \par Reports significant back pain and neck pain from seizures at [facility]. Was given flexeril and naproxen. \par \par ________________________________________________\par \par 8/3/21 \par Presenting to clinic with her  for a follow-up appointment. Last seen in clinic on June 14, 2021. She had another seizure on June 16th, 2021. \par She had another seizure requiring an ED visit on Sunday 7/18/21. Her  witnessed the event. Seizure occurred at 5:55 am. She almost fell off the bad but he was able to catch her. It lasted ~40 seconds with short post-ictal period. She had a sensatoin in right leg that spread. \par \par She reports pain in her left arm but not chest pain. She reports compliance with AEDs. \par \par She reprots feeling very frustrated regarding breakthrough seizures. She has a lot of stresses including other medical problems. Feels overwhelmed. Told her she may need to take time off work but she was concerned regarding finances. Discussed that if she continues to have seizures, I would like her to get a second opinion at [facility] and be admitted there for extended EMU monitoring. IN the meantime, will continue to go up on lamotrigine wit plan to taper clobazam or levetiracetam when she is at a therapeutic level of lamotrigine. She was taking second dose of lamotrigine in the early evening. Advised her to take it at night. \par \par ______________\par \par Presenting for follow-up. Last seen in clinic on 5/25/21. Reports racing thoughts, intense anxiety. This keeps her up at night. She reports that taking clobazam 15 mg qd makes her feel worse so she has decreased back down \par to 10 mg qdaily. She had difficulty concentrating on a higher dose of clobazam. She is taking levetiracetam 500 mg twice a day. She also takes vitamin d and magnesium to help her sleep. She also takes valerian root. \par \par She requested a therapist from work because her mood swings, she feels like she is bipolar, are causing tension with her  and family. She enjoys working. She likes to keep busy. Last seizure - \par convulsive, witnessed seizure out of sleep, witnessed by  was 5/22/1. \par \par She no longer has IUD. She is aware of risk of birth defects with clobazam. Her  and \par her are refraining from sexual activity. \par \par __________________\par 5/25/21 \par Doing okay. Had breakthrough seizure in setting of compliance with medications on 5/22/21. Gertrude has little memory but thinks she may have felt electricity on the left side of her body from her head to her toes. She then made a loud sound and woke up her partner who was sleeping next to her. He saw shaking and foaming at the mouth. It lasted for 30 seconds. She bit her tongue. She had urgency for a bowel movement after the event. \par \par She was compliant with levetiracetam 500 mg bid and clobazam 10 mg . She does not think she missed doses. \par She is not driving. She is working. She is able to walk to work. She takes care of her three children. Her dose of clobazam was subsequently increased to 15 mg qhs. \par \par Other than that , she is doing okay. Her partner, who is present at bedside states that she appears more calm, less anxious. Her mood is better overall. \par \par She saw Dr. Tejada yesterday with plan for follow-up with him in one year to review the results. \par _________________________________________________\par Last seen in clinic on 3/12/21. Doing well. Feels more calm. Getting better sleep. Less angry outbursts. No interval clinical events concerning for seizure. Last seizure out of sleep witnessed by  was on 3/20/21. She was shaking from side to side. She was in a "vegetative state" for a few minutes afterwards. Felt confused afterwards. \par \par Sexually active. Now with IUD. Discussed risks of clobazam to a fetus. Patient and  are devout catholics and therefore are considering practicing abstinence. Thinks that current IUD is causing rash, BV. Considering taking it out. \par \par Tolerating medications well. No side-effects. \par \par Reports high cervical anterior lump, not clear what this is. Painful to touch.\par \par Medications:\par  \par levetiracetam 500 mg bid \par clobazam 10 mg qhs \par ___________\par \par \par 3/22/21 \par Last seen in clinic on 2/12/21. Doing well. No clinical events concerning for seizure. Having a lot of stressful interactions with family members, particularly mother. She has a lot of anger when she comes home from work and the house is a mess. \par \par Otherwise tolerating the levetiracetam okay. She has [employer] seizure detection watch. Therefore her  can know if she is having seizures - she has nocturnal seizures and sometimes he doesn't wake up. \par They did not want to start clobazam due to concern for side-effects. \par \par Met with Dr. Tejada. Will do surveillance imaging for right insular lesion, low suspicion for malignancy. \par ______________\par Dinorah Reyes is a 40-year-old right-handed lady with a past medical history of four clinical events concerning for seizure. She is presenting after hospital follow-up for a seizure. \par \par As per her report, her first seizure was in [place] in 2013. She was 33-years old at the time. She felt tired and fell asleep. Woke up making sudden involuntary movements with her hands, R>L. She could not control the movements. She bit her tongue. No drooling or difficulty breathing. She was aware of what was happening but had no control. She fell asleep and awoke again and her tongue was bruised. She went to [facility] in [facility] and was told it may have been a panic attack. She was sent home. Prior to the event, she had no illness. \par \par Second event was out of sleep in 2019, six years later. Her  was sleeping. She awoke out of sleep and had involuntary movements, maybe of right hand greater than left with mouth movements. The event lasted 15-20 seconds. No loss of consciousness, no loss of urine, bit both sides of tongue. She was evaluated by [facility] and was told t his may be a panic attack. \par \par Third seizure was on 1/31/21 around 6 :20 AM.  awoke and witnessed whole body convulsions and foaming at the mouth. He turned her onto her right side more than left. The event lasted 20-30 seconds and then stopped. She was breathing heavily and had lost consciousness. She felt sore afterwards. \par \par The last seizure was on 2/10/21. She was brought to [place] ED> She was postictal and unable to provide detailed history. As per chart notes, ED called  who reported she was having generalized tonic clonic seizure with foaming and the mouth and bit her tongue. Seizure lasted a few minutes. Upon arrival to ED, BP was 165/82. O2 96%. She was afebrile and tachycardic at 110. She had tongue bite on right side with edema. No sign of infection. MRI brain showed T2 nonenhancing hyperintense focus within the anterior right insula, unclear etiology. She was discharged on  mg bid. \par \par \par Seizure risk factors: \par No history of brain infection, no family history of epilepsy, no history of meningitis, encephalitis. No history of learning problems. Met developmental milestones okay. No history of episode of head injury with loss of consciousness. Did have episode of high fever, was hallucinating and had visual hallucinations in setting of delirium in the past. 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 EMU admission at [facility] August 24-29. She had five seizures after levetiracetam was stopped and lamotrigine was halved. Seizures were focal to bilateral tonic clonic. \par Unfortunately all seizures had broad onset without definite right or left onset. There was rapid bisynchrony. Seizures lasted ~1 minute and occurred out of wake and sleep. There was diffuse EEG suppression at offset. \par \par Clinically seizures consisted of sudden forceful head turn to right and clonic movements, perhaps suggestive of left frontal onset. Then left arm extended out, suggestive of right SMA propagation. \par \par Seizures were variably more developed on the left or right frontotemporal region at offset.\par \par Discussed these findings with Gertrude. She was very depressed about this and the number of medications she has to take. 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 \par \par While she was at [place], her lamotrigine was increased to 150 mg bid. She is also compliant with 1 g LEV bid and clobazam 5 mg qd. \par \par She is stressed out about finances. Very tearful and hopeless. Interested in medical marijuana. \par \par ________________________________________________\par \par 8/3/21 \par Presenting to clinic with her  for a follow-up appointment. Last seen in clinic on June 14, 2021. She had another seizure on June 16th, 2021. \par She had another seizure requiring an ED visit on Sunday 7/18/21. Her  witnessed the event. Seizure occurred at 5:55 am. She almost fell off the bad but he was able to catch her. It lasted ~40 seconds with short post-ictal period. She had a sensatoin in right leg that spread. \par \par She reports pain in her left arm but not chest pain. She reports compliance with AEDs. \par \par She reprots feeling very frustrated regarding breakthrough seizures. She has a lot of stresses including other medical problems. Feels overwhelmed. Told her she may need to take time off work but she was concerned regarding finances. Discussed that if she continues to have seizures, I would like her to get a second opinion at [facility] and be admitted there for extended EMU monitoring. IN the meantime, will continue to go up on lamotrigine wit plan to taper clobazam or levetiracetam when she is at a therapeutic level of lamotrigine. She was taking second dose of lamotrigine in the early evening. Advised her to take it at night. \par \par ______________\par \par Presenting for follow-up. Last seen in clinic on 5/25/21. Reports racing thoughts, intense anxiety. This keeps her up at night. She reports that taking clobazam 15 mg qd makes her feel worse so she has decreased back down \par to 10 mg qdaily. She had difficulty concentrating on a higher dose of clobazam. She is taking levetiracetam 500 mg twice a day. She also takes vitamin d and magnesium to help her sleep. She also takes valerian root. \par \par She requested a therapist from work because her mood swings, she feels like she is bipolar, are causing tension with her  and family. She enjoys working. She likes to keep busy. Last seizure - \par convulsive, witnessed seizure out of sleep, witnessed by  was 5/22/1. \par \par She no longer has IUD. She is aware of risk of birth defects with clobazam. Her  and \par her are refraining from sexual activity. \par \par __________________\par 5/25/21 \par Doing okay. Had breakthrough seizure in setting of compliance with medications on 5/22/21. Gertrude has little memory but thinks she may have felt electricity on the left side of her body from her head to her toes.  She then made a loud sound and woke up her partner who was sleeping next to her. He saw shaking and foaming at the mouth. It lasted for 30 seconds. She bit her tongue. She had urgency for a bowel movement after the event. \par \par She was compliant with levetiracetam 500 mg bid and clobazam 10 mg . She does not think she missed doses. \par She is not driving. She is working. She is able to walk to work.  She takes care of her three children. Her dose of clobazam was subsequently increased to 15 mg qhs. \par \par Other than that , she is doing okay. Her partner, who is present at bedside states that she appears more calm, less anxious. Her mood is better overall. \par \par She saw Dr. Tejada yesterday with plan for follow-up with him in one year to review the results. \par _________________________________________________\par Last seen in clinic on 3/12/21. Doing well. Feels more calm. Getting better sleep. Less angry outbursts. No interval clinical events concerning for seizure. Last seizure out of sleep witnessed by  was on 3/20/21. She was shaking from side to side. She was in a "vegetative state" for a few minutes afterwards. Felt confused afterwards. \par \par Sexually active. Now with IUD. Discussed risks of clobazam to a fetus. Patient and  are devout catholics and therefore are considering practicing abstinence. Thinks that current IUD is causing rash, BV. Considering taking it out. \par \par Tolerating medications well. No side-effects.  \par \par Reports high cervical anterior lump, not clear what this is. Painful to touch.\par \par Medications:\par  \par levetiracetam 500 mg bid \par clobazam 10 mg qhs \par ___________\par \par \par 3/22/21 \par Last seen in clinic on 2/12/21. Doing well. No clinical events concerning for seizure. Having a lot of stressful interactions with family members, particularly mother. She has a lot of anger when she comes home from work and the house is a mess. \par \par Otherwise tolerating the levetiracetam okay. She has [employer] seizure detection watch. Therefore her  can know if she is having seizures - she has nocturnal seizures and sometimes he doesn't wake up. \par They did not want to start clobazam due to concern for side-effects.  \par \par Met with Dr. Tejada. Will do surveillance imaging for right insular lesion, low suspicion for malignancy. \par ______________\par Dinorah Reyes is a 40-year-old right-handed lady with a past medical history of four clinical events concerning for seizure. She is presenting after hospital follow-up for a seizure. \par \par As per her report, her first seizure was in [place] in 2013. She was 33-years old at the time.  She felt tired and fell asleep. Woke up making sudden involuntary movements with her hands, R>L. She could not control the movements. She bit her tongue. No drooling or difficulty breathing. She was aware of what was happening but had no control. She fell asleep and awoke again and her tongue was bruised. She went to [facility] in [facility] and was told it may have been a panic attack. She was sent home. Prior to the event, she had no illness. \par \par Second event was out of sleep in 2019, six years later. Her  was sleeping. She awoke out of sleep and had involuntary movements, maybe of right hand greater than left with mouth movements. The event lasted 15-20 seconds. No loss of consciousness, no loss of urine, bit both sides of tongue. She was evaluated by [facility] and was told t his may be a panic attack. \par \par Third seizure was on 1/31/21 around 6 :20 AM.  awoke and witnessed whole body convulsions and foaming at the mouth. He turned her onto her right side more than left. The event lasted 20-30 seconds and then stopped. She was breathing heavily and had lost consciousness. She felt sore afterwards. \par \par The last seizure was on 2/10/21. She was brought to [place] ED> She was postictal and unable to provide detailed history. As per chart notes,  ED called  who reported she was having generalized tonic clonic seizure with foaming and the mouth and bit her tongue. Seizure lasted a few minutes. Upon arrival to ED, BP was 165/82. O2 96%. She was afebrile and tachycardic at 110. She had tongue bite on right side with edema. No sign of infection. MRI brain showed T2 nonenhancing hyperintense focus within the anterior right insula, unclear etiology. She was discharged on  mg bid. \par \par \par Seizure risk factors: \par No history of brain infection, no family history of epilepsy, no history of meningitis, encephalitis. No history of learning problems. Met developmental milestones okay. No history of episode of head injury with loss of consciousness. Did have episode of high fever, was hallucinating and had visual hallucinations in setting of delirium in the past. 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

## 2021-10-21 NOTE — DATA REVIEWED
[de-identified] : 2/10/21: MRI brain with and without contrast: T2 nonenhancing non masslike hyperintense focus, centered within the anterior right insula corresponding to findings seen on recently performed CT head, nonspecific. Possible diagnostic consideration include prominent focus of gliosis from prior ischemia versus postinfectious/post inflammatory process or post ictal focus. Low - grade glial neoplasm is considered unlikely given morphology and lack of mass effect and enhancement but not entirely excluded. Follow up suggested. No evidence of mesial temporal sclerosis.  [de-identified] : aEEG : 3/19-3/20/21: rare bilateral anterior temporal delta slowing suggestive of mild focal dysfunction in these regions. Low amplitude EEG. \par EMU: 8/24/29 - [facility] : 5 clinical seizures of broad onset, possible left frontal with rapid bisynchrony. rare left frontal spikes, maximal at F3 > right frontal spikes. Fp2.  [de-identified] : 2/10/21: Creatinine 0.8, AST ALT Alk phos within normal limits \par 6/2021: LEV level 6.5, vitamin D level 25.3 low , clobazam level 250, \par 7/18/21: lamotrigine level 3.8 (2-20), levetiracetam level 37.1 \par 7/18/21: creatinine level 0.7

## 2021-11-01 ENCOUNTER — RX RENEWAL (OUTPATIENT)
Age: 41
End: 2021-11-01

## 2021-12-02 ENCOUNTER — APPOINTMENT (OUTPATIENT)
Dept: OBGYN | Facility: CLINIC | Age: 41
End: 2021-12-02
Payer: MEDICAID

## 2021-12-02 VITALS
HEIGHT: 62 IN | SYSTOLIC BLOOD PRESSURE: 122 MMHG | DIASTOLIC BLOOD PRESSURE: 80 MMHG | WEIGHT: 148 LBS | BODY MASS INDEX: 27.23 KG/M2

## 2021-12-02 DIAGNOSIS — B96.89 ACUTE VAGINITIS: ICD-10-CM

## 2021-12-02 DIAGNOSIS — N76.0 ACUTE VAGINITIS: ICD-10-CM

## 2021-12-02 LAB
HCG UR QL: NEGATIVE
QUALITY CONTROL: YES

## 2021-12-02 PROCEDURE — 81025 URINE PREGNANCY TEST: CPT

## 2021-12-02 PROCEDURE — 99213 OFFICE O/P EST LOW 20 MIN: CPT

## 2021-12-06 ENCOUNTER — RX CHANGE (OUTPATIENT)
Age: 41
End: 2021-12-06

## 2021-12-06 PROBLEM — N76.0 BACTERIAL VAGINOSIS: Status: RESOLVED | Noted: 2021-03-29 | Resolved: 2021-12-06

## 2021-12-06 LAB
ESTRADIOL SERPL-MCNC: 11 PG/ML
FSH SERPL-MCNC: 14.8 IU/L
TSH SERPL-ACNC: 0.64 UIU/ML

## 2021-12-06 NOTE — PLAN
[FreeTextEntry1] : -Labs sent.\par \par -Rx for acne provided, correct use discussed. Advised to see dermatology for further management.\par \par -Discussed that change in menstrual pattern likely related to change in medication.\par \par -Advised to schedule baseline mammogram.\par \par -I have spent 20 minutes on this encounter.\par

## 2021-12-06 NOTE — PHYSICAL EXAM
[Chaperone Declined] : Patient declined chaperone [Appropriately responsive] : appropriately responsive [Alert] : alert [No Acute Distress] : no acute distress [Soft] : soft [Non-tender] : non-tender [Oriented x3] : oriented x3 [No Lesions] : no lesions  [Labia Majora] : normal [Pink Rugae] : pink rugae [Normal] : normal [Uterine Adnexae] : normal [Tenderness] : nontender [Enlarged ___ wks] : not enlarged

## 2021-12-06 NOTE — HISTORY OF PRESENT ILLNESS
[FreeTextEntry1] : 42 y/o presents for changes in her period the past couple of months and acne.\par \par Periods have been very light in October and November lasting a couple of days.\par \par She also complains of breast tenderness\par \par Pt endorses that she has been very stressed.\par \par Pt is on multiple medications for seizure disorder with recent changes to medications.

## 2021-12-06 NOTE — HISTORY OF PRESENT ILLNESS
[FreeTextEntry1] : 40 y/o presents for changes in her period the past couple of months and acne.\par \par Periods have been very light in October and November lasting a couple of days.\par \par She also complains of breast tenderness\par \par Pt endorses that she has been very stressed.\par \par Pt is on multiple medications for seizure disorder with recent changes to medications.

## 2021-12-08 ENCOUNTER — APPOINTMENT (OUTPATIENT)
Dept: NEUROLOGY | Facility: CLINIC | Age: 41
End: 2021-12-08
Payer: MEDICAID

## 2021-12-08 VITALS
TEMPERATURE: 97.9 F | DIASTOLIC BLOOD PRESSURE: 76 MMHG | HEART RATE: 68 BPM | HEIGHT: 62 IN | SYSTOLIC BLOOD PRESSURE: 114 MMHG | OXYGEN SATURATION: 99 %

## 2021-12-08 PROCEDURE — 99215 OFFICE O/P EST HI 40 MIN: CPT

## 2021-12-08 RX ORDER — CLOBAZAM 10 MG/1
10 TABLET ORAL
Qty: 15 | Refills: 0 | Status: DISCONTINUED | COMMUNITY
Start: 2021-09-14 | End: 2021-12-08

## 2021-12-08 NOTE — PHYSICAL EXAM
[Person] : oriented to person [Place] : oriented to place [Time] : oriented to time [Concentration Intact] : normal concentrating ability [Visual Intact] : visual attention was ~T not ~L decreased [Naming Objects] : no difficulty naming common objects [Repeating Phrases] : no difficulty repeating a phrase [Writing A Sentence] : no difficulty writing a sentence [Fluency] : fluency intact [Comprehension] : comprehension intact [Reading] : reading intact [Past History] : adequate knowledge of personal past history [Cranial Nerves Optic (II)] : visual acuity intact bilaterally,  visual fields full to confrontation, pupils equal round and reactive to light [Cranial Nerves Oculomotor (III)] : extraocular motion intact [Cranial Nerves Trigeminal (V)] : facial sensation intact symmetrically [Cranial Nerves Facial (VII)] : face symmetrical [Cranial Nerves Vestibulocochlear (VIII)] : hearing was intact bilaterally [Motor Tone] : muscle tone was normal in all four extremities [Motor Strength] : muscle strength was normal in all four extremities [No Muscle Atrophy] : normal bulk in all four extremities [Motor Handedness Right-Handed] : the patient is right hand dominant [Sensation Tactile Decrease] : light touch was intact [Abnormal Walk] : normal gait [Balance] : balance was intact [Past-pointing] : there was no past-pointing [Tremor] : no tremor present [2+] : Ankle jerk left 2+ [Plantar Reflex Right Only] : normal on the right [Plantar Reflex Left Only] : normal on the left

## 2021-12-08 NOTE — DISCUSSION/SUMMARY
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

## 2021-12-08 NOTE — ASSESSMENT
[FreeTextEntry1] : Continue medications as currently managed by her neurologist\par I will repeat MRI next May and if that is unchanged I would probably defer to every 2 years.\par As long as she remains under control with lamotrigine and Keppra I do not think she is a good surgical candidate although I had a long discussion with her regarding surgery and the possibilities of seizure freedom versus side effects and risk of surgery\par \par At this point she is very happy with her current status but I would concur that her current management adequate.\par \par If she were to begin having seizures again further investigations may be necessary and she may be a good candidate for lesionectomy as it is very likely that her seizures are originating in the right insula

## 2021-12-08 NOTE — HISTORY OF PRESENT ILLNESS
[FreeTextEntry1] : Ref by Dr. Brunner for opinion on management\par \par \par Taken from Dr ALEC morales\par \par Dinorah Reyes is a 41-year-old right-handed woman from  with a past medical history of clinical events concerning for seizure. She is presenting after hospital follow-up for a seizure. \par PMHX\par \par As per her report, her first seizure was in [place] in 2013. She was 33-years old at the time. She felt tired and fell asleep. Woke up making sudden involuntary movements with her hands, R>L. She could not control the movements. She bit her tongue. No drooling or difficulty breathing. She was aware of what was happening but had no control. She fell asleep and awoke again and her tongue was bruised. She went to [facility] in [facility] and was told it may have been a panic attack. She was sent home. Prior to the event, she had no illness. \par \par Second event was out of sleep in 2019, six years later. Her  was sleeping. She awoke out of sleep and had involuntary movements, maybe of right hand greater than left with mouth movements. The event lasted 15-20 seconds. No loss of consciousness, no loss of urine, bit both sides of tongue. She was evaluated by [facility] and was told t his may be a panic attack. \par \par Third seizure was on 1/31/21 around 6 :20 AM.  awoke and witnessed whole body convulsions and foaming at the mouth. He turned her onto her right side more than left. The event lasted 20-30 seconds and then stopped. She was breathing heavily and had lost consciousness. She felt sore afterwards. \par \par The last seizure was on 2/10/21. She was brought to [place] ED> She was postictal and unable to provide detailed history. As per chart notes, ED called  who reported she was having generalized tonic clonic seizure with foaming and the mouth and bit her tongue. Seizure lasted a few minutes. Upon arrival to ED, BP was 165/82. O2 96%. She was afebrile and tachycardic at 110. She had tongue bite on right side with edema. No sign of infection. MRI brain showed T2 nonenhancing hyperintense focus within the anterior right insula, unclear etiology. She was discharged on  mg bid. \par \par \par Seizure risk factors: \par No history of brain infection, no family history of epilepsy, no history of meningitis, encephalitis. No history of learning problems. Met developmental milestones okay. No history of episode of head injury with loss of consciousness. Did have episode of high fever, was hallucinating and had visual hallucinations in setting of delirium in the past. \par \par EMU admission at [facility] August 24-29. She had five seizures after levetiracetam was stopped and lamotrigine was halved. Seizures were focal to bilateral tonic clonic. \par \par Unfortunately all seizures had broad onset without definite right or left onset. There was rapid bisynchrony. Seizures lasted ~1 minute and occurred out of wake and sleep. There was diffuse EEG suppression at offset. \par \par Clinically seizures consisted of sudden forceful head turn to right and clonic movements, perhaps suggestive of left frontal onset. Then left arm extended out, suggestive of right SMA propagation. \par \par Seizures were variably more developed on the left or right frontotemporal region at offset.\par \par I concur w the hx and findings.\par The following details were obtained from interviewing the patient again:\par The seizures have occur only from sleep except when she was medication withdrawal at the EMU.  She has never had a seizure while awake.  She had 1 seizure that she describes this year with an aura of electrical feelings in her lower extremities that rise stop and then she lost consciousness.\par The  describes the seizures as convulsive events from sleep without any other component.\par Her last seizure was July 2021.  Since the current medic [place] regimen was optimized she has had no seizures and no side effects and feels great\par \par I reviewed the EEGs as well as the MRIs in details with the patient and the \par The EEG shows poor localization of the ictal onset and bilateral spread.\par The MRI shows a small lesion in the right insula consistent with possible previous injury or cortical dysplasia or an low grade tumor.\par Her neurologic examination is normal.\par

## 2022-01-11 ENCOUNTER — RX RENEWAL (OUTPATIENT)
Age: 42
End: 2022-01-11

## 2022-03-10 NOTE — ASSESSMENT
[FreeTextEntry1] : 1.  patient has ongoing constipation\par \par 2.  has a hemorrhoid, but even without pushing, she is uncomfortable from the hemorrhoid, but not much bleeding\par \par 3.  she is doing as well as she can, but since her last baby, a lot of constipation...over the last five days..\par \par \par plan..\par \par set up colonoscopy..\par \par \par AS WE OBTAIN INFORMED CONSENT FOR COLONOSCOPY, UPPER ENDOSCOPY [EGD], OR BOTH PROCEDURES TOGETHER;\par \par As with all procedures, there are risks of which the patient should be aware\par \par 1.  Anesthesia; deep sedation with Propofol;  there is a small risk of aspiration and pulmonary infection.  The Anesthesiologist meets with the patient the morning of the procedure to discuss in more detail\par \par 2.  risk of bleeding; from removal of a polyp, or rarely, from biopsies, 1 % or less\par \par 3.  risk of injury or perforation of the colon or upper GI tract; one in a thousand or less,  from removing a polyp or from advancing the instrument\par \par \par  3y8m M with history of Crohn's disease (dx last year), DUOX2 genetic disorder, and anemia presenting with watery diarrhea x 4 days. 3y8m M with history of Crohn's disease (dx last year), DUOX2 genetic disorder, and anemia presenting with watery diarrhea x 4 days. Pt has not tolerated solids or liquids during this time and has been more sleepy than usual. Mother gave pt zofran prescribed by pmd x 1 last night which helped with the emesis however patient still had no interest in eating. Denies any blood in diarrhea. Of note, patients older brother was sick last week with similar symptoms. Patient was seen by PMD this morning and was sent to ED because pt appeared lethargic. Denies URI symptoms or fevers. Mother unsure if fully UTD on vaccines.

## 2022-03-11 ENCOUNTER — RX RENEWAL (OUTPATIENT)
Age: 42
End: 2022-03-11

## 2022-04-16 RX ORDER — MAGNESIUM OXIDE 241.3 MG/1000MG
400 TABLET ORAL
Qty: 30 | Refills: 5 | Status: ACTIVE | COMMUNITY
Start: 2022-04-16 | End: 1900-01-01

## 2022-04-29 ENCOUNTER — APPOINTMENT (OUTPATIENT)
Dept: NEUROLOGY | Facility: CLINIC | Age: 42
End: 2022-04-29
Payer: MEDICAID

## 2022-04-29 VITALS
SYSTOLIC BLOOD PRESSURE: 121 MMHG | HEIGHT: 62 IN | HEART RATE: 60 BPM | WEIGHT: 148 LBS | DIASTOLIC BLOOD PRESSURE: 72 MMHG | BODY MASS INDEX: 27.23 KG/M2

## 2022-04-29 PROCEDURE — 99215 OFFICE O/P EST HI 40 MIN: CPT

## 2022-05-01 NOTE — PHYSICAL EXAM
[FreeTextEntry1] : Mental Status: AxOx3, speech: no dysarthria,euthymic mood \par CN: visual acuity, \par III, IV, VI, PERRL, EOMI \par V sensation normal to light touch\par VII normal squint vs resistance, normal smile, face symmetric \par VIII: normal hearing \par IX, X normal gag, symmetric palate, uvula raises midline \par XI normal shrug versus resistance and lateralization of head versus resistance \par XII tongue symmetric, normal strength, no tremor or fasciculation \par Motor: full strength \par Sensory: normal to light touch b/l \par Gait : normal balance and gait\par

## 2022-05-01 NOTE — REVIEW OF SYSTEMS
[Feeling Poorly] : not feeling poorly [Numbness] : no numbness [Dizziness] : no dizziness [Anxiety] : no anxiety [Dry Eyes] : no dryness of the eyes [Chest Pain] : no chest pain [Cough] : no cough [Joint Swelling] : no joint swelling [Muscle Weakness] : no muscle weakness [Swollen Glands] : no swollen glands

## 2022-05-01 NOTE — HISTORY OF PRESENT ILLNESS
[FreeTextEntry1] : Inteval history: Last seen in clinic on 10/20/21. Doing very well. Saw Dr. Portillo for a consultation on 12/8/21. 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 He recommended repeat imaging in May and then if stable, every two years after that. He thinks she may be a good candidate for lesionectomy if seizures were to become refractory as they are likely coming from the insula. He described seizures from EMU as broad onset with rapid bisynchrony. \par \par Since July 2021, she has had no seizures. Dr. LEONARDO gave her the green light to drive. She drives just during the day, short distances. Her sleep has been good. She has not needed melatonin or magnesium. She did get her medical marijuana license but she has not needed to use any; she has not felt stressed. 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 She is planning on going on going on vacation from June 6 - June 29. \par \par Current AEDs:\par lamotrigine 250 mg bid/ 200 mg \par levetiracetam 1000 mg bid\par \par Previous medications: \par clobazam: depression, insomnia \par \par Clobazam: depression \par paradoxical event effect of medication \par \par medical marijuana - license \par ________________________________\par 10/20/2021 \par Presents today with three daughters and . Oldest daughter is in tenth grade. \par Had a family meeting today. Oldest daughter tearful. Conflict regarding mom and her moods,\par yelling. Discussed that sometimes stress and seizures can change mood and that sometimes\par mom feels scared as seizures cause pain and she loses consciousness/awareness. \par \par Discussed importance of helping around the house and not adding to stress during times \par she has seizure clusters. Discussed that she is doing well now. Discussed with mom that \par she needs to be more gentle and also be a mother and caring for her daughters who \par are also suffering. Discussed that when mom is sick , everyone suffers. \par \par Discussed possibility of formal family therapy if things don't improve. \par \par Overall Gertrude is doing well. No big convulsive seizures since [facility] admission (when \par medications were taken down and she seized). Discussed that from that admission \par we realize that levetiracetam likely plays a big role in seizure freedom. She is sleeping \par well now without ambien or klonopin. She is getting a prescription for medical marijuana. \par She did have an aura a few weeks ago consisting of electric like sensation in both legs that \par woke her up from sleep. Likely this was a small seizure that did not progress. Instructed \par her to take klonopin 1 mg when she has those episodes. \par \par Her nephew is visiting and that is improving her mood. She is back to work part time. Has right-sided \par headaches. Second opinion with Dr. Portillo is coming up. \par \par Current medication regimen: \par lamotrigine 200 mg bid \par levetiracetam 1 g bid \par clobazam 5 qhs \par _________________\par 9/7/21 \par Presented to clinic with  and daughter. EMU admission at [facility] August 24-29. She had five seizures after levetiracetam was stopped and lamotrigine was halved. Seizures were focal to bilateral tonic clonic. \par Unfortunately all seizures had broad onset without definite right or left onset. There was rapid bisynchrony. Seizures lasted ~1 minute and occurred out of wake and sleep. There was diffuse EEG suppression at offset. \par \par Clinically seizures consisted of sudden forceful head turn to right and clonic movements, perhaps suggestive of left frontal onset. Then left arm extended out, suggestive of right SMA propagation. \par \par Seizures were variably more developed on the left or right frontotemporal region at offset.\par \par Discussed these findings with Gertrude. She was very depressed about this and the number of medications she has to take. 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 \par \par While she was at [facility], her lamotrigine was increased to 150 mg bid. She is also compliant with 1 g LEV bid and clobazam 5 mg qd. \par \par She is stressed out about finances. Very tearful and hopeless. Interested in medical marijuana. \par \par Reports significant back pain and neck pain from seizures at [facility]. Was given flexeril and naproxen. \par \par ________________________________________________\par \par 8/3/21 \par Presenting to clinic with her  for a follow-up appointment. Last seen in clinic on June 14, 2021. She had another seizure on June 16th, 2021. \par She had another seizure requiring an ED visit on Sunday 7/18/21. Her  witnessed the event. Seizure occurred at 5:55 am. She almost fell off the bad but he was able to catch her. It lasted ~40 seconds with short post-ictal period. She had a sensatoin in right leg that spread. \par \par She reports pain in her left arm but not chest pain. She reports compliance with AEDs. \par \par She reprots feeling very frustrated regarding breakthrough seizures. She has a lot of stresses including other medical problems. Feels overwhelmed. Told her she may need to take time off work but she was concerned regarding finances. Discussed that if she continues to have seizures, I would like her to get a second opinion at [facility] and be admitted there for extended EMU monitoring. IN the meantime, will continue to go up on lamotrigine wit plan to taper clobazam or levetiracetam when she is at a therapeutic level of lamotrigine. She was taking second dose of lamotrigine in the early evening. Advised her to take it at night. \par \par ______________\par \par Presenting for follow-up. Last seen in clinic on 5/25/21. Reports racing thoughts, intense anxiety. This keeps her up at night. She reports that taking clobazam 15 mg qd makes her feel worse so she has decreased back down \par to 10 mg qdaily. She had difficulty concentrating on a higher dose of clobazam. She is taking levetiracetam 500 mg twice a day. She also takes vitamin d and magnesium to help her sleep. She also takes valerian root. \par \par She requested a therapist from work because her mood swings, she feels like she is bipolar, are causing tension with her  and family. She enjoys working. She likes to keep busy. Last seizure - \par convulsive, witnessed seizure out of sleep, witnessed by  was 5/22/1. \par \par She no longer has IUD. She is aware of risk of birth defects with clobazam. Her  and \par her are refraining from sexual activity. \par \par __________________\par 5/25/21 \par Doing okay. Had breakthrough seizure in setting of compliance with medications on 5/22/21. Gertrude has little memory but thinks she may have felt electricity on the left side of her body from her head to her toes. She then made a loud sound and woke up her partner who was sleeping next to her. He saw shaking and foaming at the mouth. It lasted for 30 seconds. She bit her tongue. She had urgency for a bowel movement after the event. \par \par She was compliant with levetiracetam 500 mg bid and clobazam 10 mg . She does not think she missed doses. \par She is not driving. She is working. She is able to walk to work. She takes care of her three children. Her dose of clobazam was subsequently increased to 15 mg qhs. \par \par Other than that , she is doing okay. Her partner, who is present at bedside states that she appears more calm, less anxious. Her mood is better overall. \par \par She saw Dr. Tejada yesterday with plan for follow-up with him in one year to review the results. \par _________________________________________________\par Last seen in clinic on 3/12/21. Doing well. Feels more calm. Getting better sleep. Less angry outbursts. No interval clinical events concerning for seizure. Last seizure out of sleep witnessed by  was on 3/20/21. She was shaking from side to side. She was in a "vegetative state" for a few minutes afterwards. Felt confused afterwards. \par \par Sexually active. Now with IUD. Discussed risks of clobazam to a fetus. Patient and  are devout catholics and therefore are considering practicing abstinence. Thinks that current IUD is causing rash, BV. Considering taking it out. \par \par Tolerating medications well. No side-effects. \par \par Reports high cervical anterior lump, not clear what this is. Painful to touch.\par \par Medications:\par  \par levetiracetam 500 mg bid \par clobazam 10 mg qhs \par ___________\par \par \par 3/22/21 \par Last seen in clinic on 2/12/21. Doing well. No clinical events concerning for seizure. Having a lot of stressful interactions with family members, particularly mother. She has a lot of anger when she comes home from work and the house is a mess. \par \par Otherwise tolerating the levetiracetam okay. She has [employer] seizure detection watch. Therefore her  can know if she is having seizures - she has nocturnal seizures and sometimes he doesn't wake up. \par They did not want to start clobazam due to concern for side-effects. \par \par Met with Dr. Tejada. Will do surveillance imaging for right insular lesion, low suspicion for malignancy. \par ______________\par Dinorah Reyes is a 40-year-old right-handed lady with a past medical history of four clinical events concerning for seizure. She is presenting after hospital follow-up for a seizure. \par \par As per her report, her first seizure was in [place] in 2013. She was 33-years old at the time. She felt tired and fell asleep. Woke up making sudden involuntary movements with her hands, R>L. She could not control the movements. She bit her tongue. No drooling or difficulty breathing. She was aware of what was happening but had no control. She fell asleep and awoke again and her tongue was bruised. She went to [facility] in [facility] and was told it may have been a panic attack. She was sent home. Prior to the event, she had no illness. \par \par Second event was out of sleep in 2019, six years later. Her  was sleeping. She awoke out of sleep and had involuntary movements, maybe of right hand greater than left with mouth movements. The event lasted 15-20 seconds. No loss of consciousness, no loss of urine, bit both sides of tongue. She was evaluated by [facility] and was told t his may be a panic attack. \par \par Third seizure was on 1/31/21 around 6 :20 AM.  awoke and witnessed whole body convulsions and foaming at the mouth. He turned her onto her right side more than left. The event lasted 20-30 seconds and then stopped. She was breathing heavily and had lost consciousness. She felt sore afterwards. \par \par The last seizure was on 2/10/21. She was brought to [facility] ED> She was postictal and unable to provide detailed history. As per chart notes, ED called  who reported she was having generalized tonic clonic seizure with foaming and the mouth and bit her tongue. Seizure lasted a few minutes. Upon arrival to ED, BP was 165/82. O2 96%. She was afebrile and tachycardic at 110. She had tongue bite on right side with edema. No sign of infection. MRI brain showed T2 nonenhancing hyperintense focus within the anterior right insula, unclear etiology. She was discharged on  mg bid. \par \par \par Seizure risk factors: \par No history of brain infection, no family history of epilepsy, no history of meningitis, encephalitis. No history of learning problems. Met developmental milestones okay. No history of episode of head injury with loss of consciousness. Did have episode of high fever, was hallucinating and had visual hallucinations in setting of delirium in the past. \par \par Past Medical History:\par Anemia\par \par Surgical History \par 1/2015: ovarian cyst, torsion\par \par Allergies: Bactrim \par \par Social History: \par Lives with kids and  in [place], children are 5, 12, and 15 years of age \par Caregiver \par 10th grade education\par No drugs, rare alcohol \par \par Family History: \par Mother – 77: htn, DM \par Father – smoker \par Brother – open heart surgery \par \par  \par \par \par \par \par \par ____________________\par \par _____________________________________________\par Presented to clinic with  and daughter. EMU admission at [facility] August 24-29. She had five seizures after levetiracetam was stopped and lamotrigine was halved. Seizures were focal to bilateral tonic clonic. \par Unfortunately all seizures had broad onset without definite right or left onset. There was rapid bisynchrony. Seizures lasted ~1 minute and occurred out of wake and sleep. There was diffuse EEG suppression at offset. \par \par Clinically seizures consisted of sudden forceful head turn to right and clonic movements, perhaps suggestive of left frontal onset. Then left arm extended out, suggestive of right SMA propagation. \par \par Seizures were variably more developed on the left or right frontotemporal region at offset.\par \par Discussed these findings with Gertrude. She was very depressed about this and the number of medications she has to take. 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 \par \par While she was at [facility], her lamotrigine was increased to 150 mg bid. She is also compliant with 1 g LEV bid and clobazam 5 mg qd. \par \par She is stressed out about finances. Very tearful and hopeless. Interested in medical marijuana. \par \par ________________________________________________\par \par 8/3/21 \par Presenting to clinic with her  for a follow-up appointment. Last seen in clinic on June 14, 2021. She had another seizure on June 16th, 2021. \par She had another seizure requiring an ED visit on Sunday 7/18/21. Her  witnessed the event. Seizure occurred at 5:55 am. She almost fell off the bad but he was able to catch her. It lasted ~40 seconds with short post-ictal period. She had a sensatoin in right leg that spread. \par \par She reports pain in her left arm but not chest pain. She reports compliance with AEDs. \par \par She reprots feeling very frustrated regarding breakthrough seizures. She has a lot of stresses including other medical problems. Feels overwhelmed. Told her she may need to take time off work but she was concerned regarding finances. Discussed that if she continues to have seizures, I would like her to get a second opinion at [facility] and be admitted there for extended EMU monitoring. IN the meantime, will continue to go up on lamotrigine wit plan to taper clobazam or levetiracetam when she is at a therapeutic level of lamotrigine. She was taking second dose of lamotrigine in the early evening. Advised her to take it at night. \par \par ______________\par \par Presenting for follow-up. Last seen in clinic on 5/25/21. Reports racing thoughts, intense anxiety. This keeps her up at night. She reports that taking clobazam 15 mg qd makes her feel worse so she has decreased back down \par to 10 mg qdaily. She had difficulty concentrating on a higher dose of clobazam. She is taking levetiracetam 500 mg twice a day. She also takes vitamin d and magnesium to help her sleep. She also takes valerian root. \par \par She requested a therapist from work because her mood swings, she feels like she is bipolar, are causing tension with her  and family. She enjoys working. She likes to keep busy. Last seizure - \par convulsive, witnessed seizure out of sleep, witnessed by  was 5/22/1. \par \par She no longer has IUD. She is aware of risk of birth defects with clobazam. Her  and \par her are refraining from sexual activity. \par \par __________________\par 5/25/21 \par Doing okay. Had breakthrough seizure in setting of compliance with medications on 5/22/21. Gertrude has little memory but thinks she may have felt electricity on the left side of her body from her head to her toes.  She then made a loud sound and woke up her partner who was sleeping next to her. He saw shaking and foaming at the mouth. It lasted for 30 seconds. She bit her tongue. She had urgency for a bowel movement after the event. \par \par She was compliant with levetiracetam 500 mg bid and clobazam 10 mg . She does not think she missed doses. \par She is not driving. She is working. She is able to walk to work.  She takes care of her three children. Her dose of clobazam was subsequently increased to 15 mg qhs. \par \par Other than that , she is doing okay. Her partner, who is present at bedside states that she appears more calm, less anxious. Her mood is better overall. \par \par She saw Dr. Tejada yesterday with plan for follow-up with him in one year to review the results. \par _________________________________________________\par Last seen in clinic on 3/12/21. Doing well. Feels more calm. Getting better sleep. Less angry outbursts. No interval clinical events concerning for seizure. Last seizure out of sleep witnessed by  was on 3/20/21. She was shaking from side to side. She was in a "vegetative state" for a few minutes afterwards. Felt confused afterwards. \par \par Sexually active. Now with IUD. Discussed risks of clobazam to a fetus. Patient and  are devout catholics and therefore are considering practicing abstinence. Thinks that current IUD is causing rash, BV. Considering taking it out. \par \par Tolerating medications well. No side-effects.  \par \par Reports high cervical anterior lump, not clear what this is. Painful to touch.\par \par Medications:\par  \par levetiracetam 500 mg bid \par clobazam 10 mg qhs \par ___________\par \par \par 3/22/21 \par Last seen in clinic on 2/12/21. Doing well. No clinical events concerning for seizure. Having a lot of stressful interactions with family members, particularly mother. She has a lot of anger when she comes home from work and the house is a mess. \par \par Otherwise tolerating the levetiracetam okay. She has [employer] seizure detection watch. Therefore her  can know if she is having seizures - she has nocturnal seizures and sometimes he doesn't wake up. \par They did not want to start clobazam due to concern for side-effects.  \par \par Met with Dr. Tejada. Will do surveillance imaging for right insular lesion, low suspicion for malignancy. \par ______________\par Dinorah Reyes is a 40-year-old right-handed lady with a past medical history of four clinical events concerning for seizure. She is presenting after hospital follow-up for a seizure. \par \par As per her report, her first seizure was in [place] in 2013. She was 33-years old at the time.  She felt tired and fell asleep. Woke up making sudden involuntary movements with her hands, R>L. She could not control the movements. She bit her tongue. No drooling or difficulty breathing. She was aware of what was happening but had no control. She fell asleep and awoke again and her tongue was bruised. She went to [facility] in [facility] and was told it may have been a panic attack. She was sent home. Prior to the event, she had no illness. \par \par Second event was out of sleep in 2019, six years later. Her  was sleeping. She awoke out of sleep and had involuntary movements, maybe of right hand greater than left with mouth movements. The event lasted 15-20 seconds. No loss of consciousness, no loss of urine, bit both sides of tongue. She was evaluated by [facility] and was told t his may be a panic attack. \par \par Third seizure was on 1/31/21 around 6 :20 AM.  awoke and witnessed whole body convulsions and foaming at the mouth. He turned her onto her right side more than left. The event lasted 20-30 seconds and then stopped. She was breathing heavily and had lost consciousness. She felt sore afterwards. \par \par The last seizure was on 2/10/21. She was brought to [facility] ED> She was postictal and unable to provide detailed history. As per chart notes,  ED called  who reported she was having generalized tonic clonic seizure with foaming and the mouth and bit her tongue. Seizure lasted a few minutes. Upon arrival to ED, BP was 165/82. O2 96%. She was afebrile and tachycardic at 110. She had tongue bite on right side with edema. No sign of infection. MRI brain showed T2 nonenhancing hyperintense focus within the anterior right insula, unclear etiology. She was discharged on  mg bid. \par \par \par Seizure risk factors: \par No history of brain infection, no family history of epilepsy, no history of meningitis, encephalitis. No history of learning problems. Met developmental milestones okay. No history of episode of head injury with loss of consciousness. Did have episode of high fever, was hallucinating and had visual hallucinations in setting of delirium in the past. 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

## 2022-05-01 NOTE — DATA REVIEWED
[de-identified] : 2/10/21: MRI brain with and without contrast: T2 nonenhancing non masslike hyperintense focus, centered within the anterior right insula corresponding to findings seen on recently performed CT head, nonspecific. Possible diagnostic consideration include prominent focus of gliosis from prior ischemia versus postinfectious/post inflammatory process or post ictal focus. Low - grade glial neoplasm is considered unlikely given morphology and lack of mass effect and enhancement but not entirely excluded. Follow up suggested. No evidence of mesial temporal sclerosis.  [de-identified] : aEEG : 3/19-3/20/21: rare bilateral anterior temporal delta slowing suggestive of mild focal dysfunction in these regions. Low amplitude EEG. \par EMU: 8/24/29 - [facility] : 5 clinical seizures of broad onset, possible left frontal with rapid bisynchrony. rare left frontal spikes, maximal at F3 > right frontal spikes. Fp2.  [de-identified] : 2/10/21: Creatinine 0.8, AST ALT Alk phos within normal limits \par 6/2021: LEV level 6.5, vitamin D level 25.3 low , clobazam level 250, \par 7/18/21: lamotrigine level 3.8 (2-20), levetiracetam level 37.1 \par 7/18/21: creatinine level 0.7

## 2022-05-01 NOTE — ASSESSMENT
[FreeTextEntry1] : Please continue lamotrigine 250 mg in the morning and 200 mg at night \par Please continue levetiracetam 1000 mg twice a day \par Folic acid 0.4 mg incase of unplanned pregnancy\par Please take Klonopin 0.5 mg as needed for severe anxiety or after a seizure \par Please get MRI brain with and without contrast sometime in May \par In June or July please Dr. Tejada. to review imaging \par Please follow-up with Dr. Portillo at [facility], epileptologist in July 2022 \par \par RTC in 4-5 months. \par

## 2022-05-01 NOTE — DISCUSSION/SUMMARY
[FreeTextEntry1] : Gertrude is a pleasant 41-year-old lady with history of epilepsy. First seizure was at age 33, with second seizure years later. Saw Dr. Tejada for right insula lesion, doubt neoplastic, will have surveillance imaging follow-up within one year - 6/2022. \par \par Seizures appear to be focal to bilateral tonic clonic seizures. Occur out of sleep.\par \par Has had ~ 7 lifetime seizures. Most recent seizure 7/18/21 out of sleep. Prior to seizure, she may get a rush of tingling (insular) - this is followed by a generalized brief seizure with no significant post-ictal confusion. Seizures are occurring despite increase in medication. \par \par  Has right insular hyperintense focus, unclear etiology, may be post-infectious. Appears to have seizures with variable propagation. Possible she has prorogation anteriorly to the frontal lobe with focal to bilateral tonic clonic seizure. Left-sided tingling, electric like sensation may point to insular involvement. \par \par Ambulatory EEG 3/2021 with rare bilateral anterior temporal delta slowing in drowsiness, no epileptiform potential or seizures. \par \par She had admission to [facility] 8/24/21. 5 bilateral tonic clonic seizures out of wake and sleep when levetiracetam was lowered. Seizures consisted of abrupt right head turn followed by flexion of both arms with clonic movements - then left arm extension suggestive of right SMA involvement. \par \par \par Seizures were not well localized. Lateralization unclear. Appeared to be likely frontal with rapid bisynchrony. Lasted one minute. may be surgical candidate if seizures not well controlled - would need phase III intracranial study with coverage of bilateral frontotemporal regions including right insula followed by RNS or DBS. DBS may not be optimal given psychiatric comorbidity. Will need PET. Neuropsych testing -\par then will present in surgical conference. Seizures appeared to have rapid bisynchrony.  Could not tell if coming from right or the left. With second opinion from Dr. Portillo consultation, he thinks that right insular lesion is likely seizure onset zone. She is not interested in epilepsy surgery at this time. \par \par Clinically, seizures all with similar semiology. Right head turn (appears forceful) with clonic movements --> followed by left arm extension may suggest left frontal onset with rapid propagation to right SMA. However, she has right insular lesion. \par \par Seizures in the EMU however - were not her typical seizures. She had clusters after medication reduction and they were all out of wake. Her typical seizures are only nocturnal. \par \par Doing well now with no seizures since July 2021.  Feeling good on lamotrigine. Sleep is good.  Appears that levetiracetam may be most efficacious for seizures and that lamotrigine is helping as well. Lamotrigine is also helping her mood. \par \par Counseled extensively on seizure safety. \par

## 2022-05-18 ENCOUNTER — RESULT REVIEW (OUTPATIENT)
Age: 42
End: 2022-05-18

## 2022-06-02 ENCOUNTER — APPOINTMENT (OUTPATIENT)
Dept: OBGYN | Facility: CLINIC | Age: 42
End: 2022-06-02

## 2022-07-29 ENCOUNTER — RX RENEWAL (OUTPATIENT)
Age: 42
End: 2022-07-29

## 2022-08-30 ENCOUNTER — APPOINTMENT (OUTPATIENT)
Dept: NEUROLOGY | Facility: CLINIC | Age: 42
End: 2022-08-30

## 2022-08-30 PROCEDURE — 99215 OFFICE O/P EST HI 40 MIN: CPT | Mod: 95

## 2022-08-30 RX ORDER — LAMOTRIGINE 200 MG/1
200 TABLET ORAL TWICE DAILY
Qty: 120 | Refills: 3 | Status: DISCONTINUED | COMMUNITY
Start: 2022-04-29 | End: 2022-08-30

## 2022-08-30 NOTE — PHYSICAL EXAM
[FreeTextEntry1] : Telehealth Visit:\par Speech is fluent. \par Face activates symmetrically \par Seen walking with steady gait \par

## 2022-08-30 NOTE — DATA REVIEWED
[de-identified] : 5/18/22: MRI brain with and without contrast: T2 nonenhancing, non masslike hyperintense focus within the right insula unchanged \par in appearance from May 2021. Possible consideration includes epileptogenic /postictal focus, gliosis from prior ischemia versus \par postinfectious/post inflammatory process such as autoimmune encephalitis. No enhancing lesion. \par 2/10/21: MRI brain with and without contrast: T2 nonenhancing non masslike hyperintense focus, centered within the anterior right insula corresponding to findings seen on recently performed CT head, nonspecific. Possible diagnostic consideration include prominent focus of gliosis from prior ischemia versus postinfectious/post inflammatory process or post ictal focus. Low - grade glial neoplasm is considered unlikely given morphology and lack of mass effect and enhancement but not entirely excluded. Follow up suggested. No evidence of mesial temporal sclerosis.  [de-identified] : aEEG : 3/19-3/20/21: rare bilateral anterior temporal delta slowing suggestive of mild focal dysfunction in these regions. Low amplitude EEG. \par EMU: 8/24/29 - [facility] : 5 clinical seizures of broad onset, possible left frontal with rapid bisynchrony. rare left frontal spikes, maximal at F3 > right frontal spikes. Fp2.  [de-identified] : 2/10/21: Creatinine 0.8, AST ALT Alk phos within normal limits \par 6/2021: LEV level 6.5, vitamin D level 25.3 low , clobazam level 250, \par 7/18/21: lamotrigine level 3.8 (2-20), levetiracetam level 37.1 \par 7/18/21: creatinine level 0.7

## 2022-08-30 NOTE — ASSESSMENT
[FreeTextEntry1] : Please continue lamotrigine 250 mg in the morning and 200 mg at night \par Please continue levetiracetam 1000 mg twice a day \par Please take Klonopin 0.5 mg as needed for severe anxiety or after a seizure \par Please see Dr. Tejada in a couple months to review imaging \par make follow up appointment with Dr. LEONARDO. \par Will send refills and get repeat labwork\par Return to clinic in four months - telehealth visit is okay.

## 2022-08-30 NOTE — HISTORY OF PRESENT ILLNESS
[Other Location: e.g. School (Enter Location, City,State)___] : at [unfilled], at the time of the visit. [Medical Office: ([facility])___] : at the medical office located in  [Verbal consent obtained from patient] : the patient, [unfilled] [FreeTextEntry1] : Last seen in clinic on 4/29/22. Doing well. No clinical events concerning for seizure. Recently travelled to the [place]. Forgot to take medications one night but still felt fine. Contracted COVID. Felt like she was "dying". Went to ED, supportive care. Was out of work for one month because daughter was sick. Otherwise doing well. No plan on having more children. No side-effect from medication. Works five or six days a week. \par \par Current AEDs:\par lamotrigine 250 mg / 200 mg \par levetiracetam 1000 mg bid\par __________________\par 4/29/22 \par Interval history: Last seen in clinic on 10/20/21. Doing very well. Saw Dr. Portillo for a consultation on 12/8/21. 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 He recommended repeat imaging in May and then if stable, every two years after that. He thinks she may be a good candidate for lesionectomy if seizures were to become refractory as they are likely coming from the insula. He described seizures from EMU as broad onset with rapid bisynchrony. \par \par Since July 2021, she has had no seizures. Dr. LEONARDO gave her the green light to drive. She drives just during the day, short distances. Her sleep has been good. She has not needed melatonin or magnesium. She did get her medical marijuana license but she has not needed to use any; she has not felt stressed. 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 She is planning on going on going on vacation from June 6 - June 29. \par \par Current AEDs:\par lamotrigine 250 mg bid/ 200 mg \par levetiracetam 1000 mg bid\par \par Previous medications: \par clobazam: depression, insomnia \par \par Clobazam: depression \par paradoxical event effect of medication \par \par medical marijuana - license \par ________________________________\par 10/20/2021 \par Presents today with three daughters and . Oldest daughter is in tenth grade. \par Had a family meeting today. Oldest daughter tearful. Conflict regarding mom and her moods,\par yelling. Discussed that sometimes stress and seizures can change mood and that sometimes\par mom feels scared as seizures cause pain and she loses consciousness/awareness. \par \par Discussed importance of helping around the house and not adding to stress during times \par she has seizure clusters. Discussed that she is doing well now. Discussed with mom that \par she needs to be more gentle and also be a mother and caring for her daughters who \par are also suffering. Discussed that when mom is sick , everyone suffers. \par \par Discussed possibility of formal family therapy if things don't improve. \par \par Overall Gertrude is doing well. No big convulsive seizures since [facility] admission (when \par medications were taken down and she seized). Discussed that from that admission \par we realize that levetiracetam likely plays a big role in seizure freedom. She is sleeping \par well now without ambien or klonopin. She is getting a prescription for medical marijuana. \par She did have an aura a few weeks ago consisting of electric like sensation in both legs that \par woke her up from sleep. Likely this was a small seizure that did not progress. Instructed \par her to take klonopin 1 mg when she has those episodes. \par \par Her nephew is visiting and that is improving her mood. She is back to work part time. Has right-sided \par headaches. Second opinion with Dr. Portillo is coming up. \par \par Current medication regimen: \par lamotrigine 200 mg bid \par levetiracetam 1 g bid \par clobazam 5 qhs \par _________________\par 9/7/21 \par Presented to clinic with  and daughter. EMU admission at [facility] August 24-29. She had five seizures after levetiracetam was stopped and lamotrigine was halved. Seizures were focal to bilateral tonic clonic. \par Unfortunately all seizures had broad onset without definite right or left onset. There was rapid bisynchrony. Seizures lasted ~1 minute and occurred out of wake and sleep. There was diffuse EEG suppression at offset. \par \par Clinically seizures consisted of sudden forceful head turn to right and clonic movements, perhaps suggestive of left frontal onset. Then left arm extended out, suggestive of right SMA propagation. \par \par Seizures were variably more developed on the left or right frontotemporal region at offset.\par \par Discussed these findings with Gertrude. She was very depressed about this and the number of medications she has to take. 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 \par \par While she was at [place], her lamotrigine was increased to 150 mg bid. She is also compliant with 1 g LEV bid and clobazam 5 mg qd. \par \par She is stressed out about finances. Very tearful and hopeless. Interested in medical marijuana. \par \par Reports significant back pain and neck pain from seizures at [place]. Was given flexeril and naproxen. \par \par ________________________________________________\par \par 8/3/21 \par Presenting to clinic with her  for a follow-up appointment. Last seen in clinic on June 14, 2021. She had another seizure on June 16th, 2021. \par She had another seizure requiring an ED visit on Sunday 7/18/21. Her  witnessed the event. Seizure occurred at 5:55 am. She almost fell off the bad but he was able to catch her. It lasted ~40 seconds with short post-ictal period. She had a sensatoin in right leg that spread. \par \par She reports pain in her left arm but not chest pain. She reports compliance with AEDs. \par \par She reprots feeling very frustrated regarding breakthrough seizures. She has a lot of stresses including other medical problems. Feels overwhelmed. Told her she may need to take time off work but she was concerned regarding finances. Discussed that if she continues to have seizures, I would like her to get a second opinion at [place] and be admitted there for extended EMU monitoring. IN the meantime, will continue to go up on lamotrigine wit plan to taper clobazam or levetiracetam when she is at a therapeutic level of lamotrigine. She was taking second dose of lamotrigine in the early evening. Advised her to take it at night. \par \par ______________\par \par Presenting for follow-up. Last seen in clinic on 5/25/21. Reports racing thoughts, intense anxiety. This keeps her up at night. She reports that taking clobazam 15 mg qd makes her feel worse so she has decreased back down \par to 10 mg qdaily. She had difficulty concentrating on a higher dose of clobazam. She is taking levetiracetam 500 mg twice a day. She also takes vitamin d and magnesium to help her sleep. She also takes valerian root. \par \par She requested a therapist from work because her mood swings, she feels like she is bipolar, are causing tension with her  and family. She enjoys working. She likes to keep busy. Last seizure - \par convulsive, witnessed seizure out of sleep, witnessed by  was 5/22/1. \par \par She no longer has IUD. She is aware of risk of birth defects with clobazam. Her  and \par her are refraining from sexual activity. \par \par __________________\par 5/25/21 \par Doing okay. Had breakthrough seizure in setting of compliance with medications on 5/22/21. Gertrude has little memory but thinks she may have felt electricity on the left side of her body from her head to her toes. She then made a loud sound and woke up her partner who was sleeping next to her. He saw shaking and foaming at the mouth. It lasted for 30 seconds. She bit her tongue. She had urgency for a bowel movement after the event. \par \par She was compliant with levetiracetam 500 mg bid and clobazam 10 mg . She does not think she missed doses. \par She is not driving. She is working. She is able to walk to work. She takes care of her three children. Her dose of clobazam was subsequently increased to 15 mg qhs. \par \par Other than that , she is doing okay. Her partner, who is present at bedside states that she appears more calm, less anxious. Her mood is better overall. \par \par She saw Dr. Tejada yesterday with plan for follow-up with him in one year to review the results. \par _________________________________________________\par Last seen in clinic on 3/12/21. Doing well. Feels more calm. Getting better sleep. Less angry outbursts. No interval clinical events concerning for seizure. Last seizure out of sleep witnessed by  was on 3/20/21. She was shaking from side to side. She was in a "vegetative state" for a few minutes afterwards. Felt confused afterwards. \par \par Sexually active. Now with IUD. Discussed risks of clobazam to a fetus. Patient and  are devout catholics and therefore are considering practicing abstinence. Thinks that current IUD is causing rash, BV. Considering taking it out. \par \par Tolerating medications well. No side-effects. \par \par Reports high cervical anterior lump, not clear what this is. Painful to touch.\par \par Medications:\par  \par levetiracetam 500 mg bid \par clobazam 10 mg qhs \par ___________\par \par \par 3/22/21 \par Last seen in clinic on 2/12/21. Doing well. No clinical events concerning for seizure. Having a lot of stressful interactions with family members, particularly mother. She has a lot of anger when she comes home from work and the house is a mess. \par \par Otherwise tolerating the levetiracetam okay. She has [employer] seizure detection watch. Therefore her  can know if she is having seizures - she has nocturnal seizures and sometimes he doesn't wake up. \par They did not want to start clobazam due to concern for side-effects. \par \par Met with Dr. Tejada. Will do surveillance imaging for right insular lesion, low suspicion for malignancy. \par ______________\par Dinorah Reyes is a 40-year-old right-handed lady with a past medical history of four clinical events concerning for seizure. She is presenting after hospital follow-up for a seizure. \par \par As per her report, her first seizure was in [place] in 2013. She was 33-years old at the time. She felt tired and fell asleep. Woke up making sudden involuntary movements with her hands, R>L. She could not control the movements. She bit her tongue. No drooling or difficulty breathing. She was aware of what was happening but had no control. She fell asleep and awoke again and her tongue was bruised. She went to [facility] in [facility] and was told it may have been a panic attack. She was sent home. Prior to the event, she had no illness. \par \par Second event was out of sleep in 2019, six years later. Her  was sleeping. She awoke out of sleep and had involuntary movements, maybe of right hand greater than left with mouth movements. The event lasted 15-20 seconds. No loss of consciousness, no loss of urine, bit both sides of tongue. She was evaluated by [facility] and was told t his may be a panic attack. \par \par Third seizure was on 1/31/21 around 6 :20 AM.  awoke and witnessed whole body convulsions and foaming at the mouth. He turned her onto her right side more than left. The event lasted 20-30 seconds and then stopped. She was breathing heavily and had lost consciousness. She felt sore afterwards. \par \par The last seizure was on 2/10/21. She was brought to [place] ED> She was postictal and unable to provide detailed history. As per chart notes, ED called  who reported she was having generalized tonic clonic seizure with foaming and the mouth and bit her tongue. Seizure lasted a few minutes. Upon arrival to ED, BP was 165/82. O2 96%. She was afebrile and tachycardic at 110. She had tongue bite on right side with edema. No sign of infection. MRI brain showed T2 nonenhancing hyperintense focus within the anterior right insula, unclear etiology. She was discharged on  mg bid. \par \par \par Seizure risk factors: \par No history of brain infection, no family history of epilepsy, no history of meningitis, encephalitis. No history of learning problems. Met developmental milestones okay. No history of episode of head injury with loss of consciousness. Did have episode of high fever, was hallucinating and had visual hallucinations in setting of delirium in the past. \par \par Past Medical History:\par Anemia\par \par Surgical History \par 1/2015: ovarian cyst, torsion\par \par Allergies: Bactrim \par \par Social History: \par Lives with kids and  in [place], children are 5, 12, and 15 years of age \par Caregiver \par 10th grade education\par No drugs, rare alcohol \par \par Family History: \par Mother – 77: htn, DM \par Father – smoker \par Brother – open heart surgery \par \par  \par \par \par \par \par \par ____________________\par \par _____________________________________________\par Presented to clinic with  and daughter. EMU admission at [facility] August 24-29. She had five seizures after levetiracetam was stopped and lamotrigine was halved. Seizures were focal to bilateral tonic clonic. \par Unfortunately all seizures had broad onset without definite right or left onset. There was rapid bisynchrony. Seizures lasted ~1 minute and occurred out of wake and sleep. There was diffuse EEG suppression at offset. \par \par Clinically seizures consisted of sudden forceful head turn to right and clonic movements, perhaps suggestive of left frontal onset. Then left arm extended out, suggestive of right SMA propagation. \par \par Seizures were variably more developed on the left or right frontotemporal region at offset.\par \par Discussed these findings with Gertrude. She was very depressed about this and the number of medications she has to take. 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 \par \par While she was at [place], her lamotrigine was increased to 150 mg bid. She is also compliant with 1 g LEV bid and clobazam 5 mg qd. \par \par She is stressed out about finances. Very tearful and hopeless. Interested in medical marijuana. \par \par ________________________________________________\par \par 8/3/21 \par Presenting to clinic with her  for a follow-up appointment. Last seen in clinic on June 14, 2021. She had another seizure on June 16th, 2021. \par She had another seizure requiring an ED visit on Sunday 7/18/21. Her  witnessed the event. Seizure occurred at 5:55 am. She almost fell off the bad but he was able to catch her. It lasted ~40 seconds with short post-ictal period. She had a sensatoin in right leg that spread. \par \par She reports pain in her left arm but not chest pain. She reports compliance with AEDs. \par \par She reprots feeling very frustrated regarding breakthrough seizures. She has a lot of stresses including other medical problems. Feels overwhelmed. Told her she may need to take time off work but she was concerned regarding finances. Discussed that if she continues to have seizures, I would like her to get a second opinion at [place] and be admitted there for extended EMU monitoring. IN the meantime, will continue to go up on lamotrigine wit plan to taper clobazam or levetiracetam when she is at a therapeutic level of lamotrigine. She was taking second dose of lamotrigine in the early evening. Advised her to take it at night. \par \par ______________\par \par Presenting for follow-up. Last seen in clinic on 5/25/21. Reports racing thoughts, intense anxiety. This keeps her up at night. She reports that taking clobazam 15 mg qd makes her feel worse so she has decreased back down \par to 10 mg qdaily. She had difficulty concentrating on a higher dose of clobazam. She is taking levetiracetam 500 mg twice a day. She also takes vitamin d and magnesium to help her sleep. She also takes valerian root. \par \par She requested a therapist from work because her mood swings, she feels like she is bipolar, are causing tension with her  and family. She enjoys working. She likes to keep busy. Last seizure - \par convulsive, witnessed seizure out of sleep, witnessed by  was 5/22/1. \par \par She no longer has IUD. She is aware of risk of birth defects with clobazam. Her  and \par her are refraining from sexual activity. \par \par __________________\par 5/25/21 \par Doing okay. Had breakthrough seizure in setting of compliance with medications on 5/22/21. Gertrude has little memory but thinks she may have felt electricity on the left side of her body from her head to her toes.  She then made a loud sound and woke up her partner who was sleeping next to her. He saw shaking and foaming at the mouth. It lasted for 30 seconds. She bit her tongue. She had urgency for a bowel movement after the event. \par \par She was compliant with levetiracetam 500 mg bid and clobazam 10 mg . She does not think she missed doses. \par She is not driving. She is working. She is able to walk to work.  She takes care of her three children. Her dose of clobazam was subsequently increased to 15 mg qhs. \par \par Other than that , she is doing okay. Her partner, who is present at bedside states that she appears more calm, less anxious. Her mood is better overall. \par \par She saw Dr. Tejada yesterday with plan for follow-up with him in one year to review the results. \par _________________________________________________\par Last seen in clinic on 3/12/21. Doing well. Feels more calm. Getting better sleep. Less angry outbursts. No interval clinical events concerning for seizure. Last seizure out of sleep witnessed by  was on 3/20/21. She was shaking from side to side. She was in a "vegetative state" for a few minutes afterwards. Felt confused afterwards. \par \par Sexually active. Now with IUD. Discussed risks of clobazam to a fetus. Patient and  are devout catholics and therefore are considering practicing abstinence. Thinks that current IUD is causing rash, BV. Considering taking it out. \par \par Tolerating medications well. No side-effects.  \par \par Reports high cervical anterior lump, not clear what this is. Painful to touch.\par \par Medications:\par  \par levetiracetam 500 mg bid \par clobazam 10 mg qhs \par ___________\par \par \par 3/22/21 \par Last seen in clinic on 2/12/21. Doing well. No clinical events concerning for seizure. Having a lot of stressful interactions with family members, particularly mother. She has a lot of anger when she comes home from work and the house is a mess. \par \par Otherwise tolerating the levetiracetam okay. She has [employer] seizure detection watch. Therefore her  can know if she is having seizures - she has nocturnal seizures and sometimes he doesn't wake up. \par They did not want to start clobazam due to concern for side-effects.  \par \par Met with Dr. Tejada. Will do surveillance imaging for right insular lesion, low suspicion for malignancy. \par ______________\par Dinorah Reyes is a 40-year-old right-handed lady with a past medical history of four clinical events concerning for seizure. She is presenting after hospital follow-up for a seizure. \par \par As per her report, her first seizure was in [place] in 2013. She was 33-years old at the time.  She felt tired and fell asleep. Woke up making sudden involuntary movements with her hands, R>L. She could not control the movements. She bit her tongue. No drooling or difficulty breathing. She was aware of what was happening but had no control. She fell asleep and awoke again and her tongue was bruised. She went to [facility] in [facility] and was told it may have been a panic attack. She was sent home. Prior to the event, she had no illness. \par \par Second event was out of sleep in 2019, six years later. Her  was sleeping. She awoke out of sleep and had involuntary movements, maybe of right hand greater than left with mouth movements. The event lasted 15-20 seconds. No loss of consciousness, no loss of urine, bit both sides of tongue. She was evaluated by [facility] and was told t his may be a panic attack. \par \par Third seizure was on 1/31/21 around 6 :20 AM.  awoke and witnessed whole body convulsions and foaming at the mouth. He turned her onto her right side more than left. The event lasted 20-30 seconds and then stopped. She was breathing heavily and had lost consciousness. She felt sore afterwards. \par \par The last seizure was on 2/10/21. She was brought to [place] ED> She was postictal and unable to provide detailed history. As per chart notes,  ED called  who reported she was having generalized tonic clonic seizure with foaming and the mouth and bit her tongue. Seizure lasted a few minutes. Upon arrival to ED, BP was 165/82. O2 96%. She was afebrile and tachycardic at 110. She had tongue bite on right side with edema. No sign of infection. MRI brain showed T2 nonenhancing hyperintense focus within the anterior right insula, unclear etiology. She was discharged on  mg bid. \par \par \par Seizure risk factors: \par No history of brain infection, no family history of epilepsy, no history of meningitis, encephalitis. No history of learning problems. Met developmental milestones okay. No history of episode of head injury with loss of consciousness. Did have episode of high fever, was hallucinating and had visual hallucinations in setting of delirium in the past. 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

## 2022-09-20 ENCOUNTER — NON-APPOINTMENT (OUTPATIENT)
Age: 42
End: 2022-09-20

## 2022-09-20 ENCOUNTER — APPOINTMENT (OUTPATIENT)
Dept: SURGERY | Facility: CLINIC | Age: 42
End: 2022-09-20

## 2022-09-20 VITALS
OXYGEN SATURATION: 100 % | HEART RATE: 77 BPM | WEIGHT: 155 LBS | HEIGHT: 62 IN | DIASTOLIC BLOOD PRESSURE: 69 MMHG | RESPIRATION RATE: 18 BRPM | BODY MASS INDEX: 28.52 KG/M2 | TEMPERATURE: 98.5 F | SYSTOLIC BLOOD PRESSURE: 139 MMHG

## 2022-09-20 PROCEDURE — 99202 OFFICE O/P NEW SF 15 MIN: CPT

## 2022-09-30 NOTE — ASSESSMENT
[FreeTextEntry1] : 5-year-old female with recurring superficial cutaneous infections over the bilateral buttocks.  Do not appear to be associated pilonidal cyst or abscess.  Too far from the anal opening to be concerning for perirectal or perianal abscess.\par \par Patient should continue with local wound care.  Recommend follow-up with dermatology for further assessment and see if any suppressive therapy is warranted given the recurrent nature

## 2022-09-30 NOTE — HISTORY OF PRESENT ILLNESS
[FreeTextEntry1] : 42-year-old female here for initial evaluation for left buttock abscess.  Patient was recently seen in the ER where she was noted to have a spontaneously draining abscess that was drained further.  She is here for subsequent follow-up to reassess the wound.  She feels like the abscess is healing without issue.  Denies fever.  Pain is improving in the area.  She has had several similar abscesses spread over the skin of the buttock area.  Does not believe she has ever been told she has pilonidal abscess or anal fistula.

## 2022-09-30 NOTE — PHYSICAL EXAM
[Abdomen Masses] : No abdominal masses [Abdomen Tenderness] : ~T No ~M abdominal tenderness [JVD] : no jugular venous distention  [Respiratory Effort] : normal respiratory effort [Alert] : not alert [Calm] : calm [de-identified] : No acute distress [de-identified] : Bilateral buttocks inspected and left abscess site healing well without erythema.  Small residual scar is very superficial.

## 2022-10-14 NOTE — DISCUSSION/SUMMARY
[FreeTextEntry1] : Gertrude is a pleasant 42-year-old lady with a history of epilepsy. First seizure was at age 33, with second seizure years later. Saw Dr. Tejada for right insula lesion, doubt neoplastic. Repeat imaging 5/2022 \par \par Seizures appear to be focal to bilateral tonic clonic seizures. Occur out of sleep.\par \par Has had ~ 7 lifetime seizures. Most recent seizure 7/18/21 out of sleep. Prior to seizure, she may get a rush of tingling (insular) - this is followed by a generalized brief seizure with no significant post-ictal confusion. Seizures are occurring despite increase in medication. \par \par  Has right insular hyperintense focus, unclear etiology, may be post-infectious. Appears to have seizures with variable propagation. Possible she has prorogation anteriorly to the frontal lobe with focal to bilateral tonic clonic seizure. Left-sided tingling, electric like sensation may point to insular involvement. \par \par Ambulatory EEG 3/2021 with rare bilateral anterior temporal delta slowing in drowsiness, no epileptiform potential or seizures. \par \par She had admission to [facility] 8/24/21. 5 bilateral tonic clonic seizures out of wake and sleep when levetiracetam was lowered. Seizures consisted of abrupt right head turn followed by flexion of both arms with clonic movements - then left arm extension suggestive of right SMA involvement. \par \par \par Seizures were not well localized. Lateralization unclear. Appeared to be likely frontal with rapid bisynchrony. Lasted one minute. may be surgical candidate if seizures not well controlled - would need phase III intracranial study with coverage of bilateral frontotemporal regions including right insula followed by RNS or DBS. DBS may not be optimal given psychiatric comorbidity. Will need PET. Neuropsych testing -\par then will present in surgical conference. Seizures appeared to have rapid bisynchrony. Could not tell if coming from right or the left. With second opinion from Dr. Portillo consultation, he thinks that right insular lesion is likely seizure onset zone. She is not interested in epilepsy surgery at this time. \par \par Clinically, seizures all with similar semiology. Right head turn (appears forceful) with clonic movements --> followed by left arm extension may suggest left frontal onset with rapid propagation to right SMA. However, she has right insular lesion. \par \par Seizures in the EMU however - were not her typical seizures. She had clusters after medication reduction and they were all out of wake. Her typical seizures are only nocturnal. \par \par Doing well now with no seizures since July 2021. Feeling good on lamotrigine. Sleep is good. Appears that levetiracetam may be most efficacious for seizures and that lamotrigine is helping as well. Lamotrigine is also helping her mood. \par \par Counseled extensively on seizure safety. \par 
Gastritis

## 2022-11-14 RX ORDER — LAMOTRIGINE 200 MG/1
200 TABLET ORAL
Qty: 60 | Refills: 3 | Status: DISCONTINUED | COMMUNITY
Start: 2022-05-24 | End: 2022-11-14

## 2022-11-14 RX ORDER — LAMOTRIGINE 25 MG/1
25 TABLET ORAL TWICE DAILY
Qty: 120 | Refills: 3 | Status: DISCONTINUED | COMMUNITY
Start: 2022-04-29 | End: 2022-11-14

## 2022-11-14 RX ORDER — LAMOTRIGINE 200 MG/1
200 TABLET ORAL TWICE DAILY
Qty: 60 | Refills: 3 | Status: DISCONTINUED | COMMUNITY
Start: 2021-11-01 | End: 2022-11-14

## 2022-11-14 RX ORDER — LEVETIRACETAM 1000 MG/1
1000 TABLET, FILM COATED ORAL TWICE DAILY
Qty: 60 | Refills: 2 | Status: DISCONTINUED | COMMUNITY
Start: 2022-04-16 | End: 2022-11-14

## 2022-11-14 RX ORDER — LAMOTRIGINE 25 MG/1
25 TABLET ORAL
Qty: 120 | Refills: 0 | Status: DISCONTINUED | COMMUNITY
Start: 2021-10-08 | End: 2022-11-14

## 2022-11-14 RX ORDER — LEVETIRACETAM 1000 MG/1
1000 TABLET, FILM COATED ORAL TWICE DAILY
Qty: 180 | Refills: 3 | Status: DISCONTINUED | COMMUNITY
Start: 2022-04-29 | End: 2022-11-14

## 2022-11-14 RX ORDER — LAMOTRIGINE 25 MG/1
25 TABLET ORAL
Qty: 180 | Refills: 0 | Status: DISCONTINUED | COMMUNITY
Start: 2022-08-30 | End: 2022-11-14

## 2022-11-14 RX ORDER — LAMOTRIGINE 200 MG/1
200 TABLET ORAL
Qty: 180 | Refills: 0 | Status: DISCONTINUED | COMMUNITY
Start: 2022-08-30 | End: 2022-11-14

## 2022-11-14 RX ORDER — LEVETIRACETAM 1000 MG/1
1000 TABLET, FILM COATED ORAL TWICE DAILY
Qty: 60 | Refills: 5 | Status: DISCONTINUED | COMMUNITY
Start: 2022-05-24 | End: 2022-11-14

## 2022-11-14 RX ORDER — MAGNESIUM OXIDE 241.3 MG/1000MG
400 TABLET ORAL
Qty: 30 | Refills: 5 | Status: DISCONTINUED | COMMUNITY
Start: 2021-05-01 | End: 2022-11-14

## 2022-11-14 RX ORDER — LAMOTRIGINE 25 MG/1
25 TABLET ORAL TWICE DAILY
Qty: 120 | Refills: 3 | Status: DISCONTINUED | COMMUNITY
Start: 2022-02-11 | End: 2022-11-14

## 2022-11-14 RX ORDER — LEVETIRACETAM 1000 MG/1
1000 TABLET, FILM COATED ORAL TWICE DAILY
Qty: 60 | Refills: 2 | Status: DISCONTINUED | COMMUNITY
Start: 2021-07-19 | End: 2022-11-14

## 2022-12-12 ENCOUNTER — RX RENEWAL (OUTPATIENT)
Age: 42
End: 2022-12-12

## 2022-12-12 RX ORDER — VALACYCLOVIR 500 MG/1
500 TABLET, FILM COATED ORAL
Qty: 90 | Refills: 1 | Status: ACTIVE | COMMUNITY
Start: 2021-07-07 | End: 1900-01-01

## 2022-12-30 ENCOUNTER — APPOINTMENT (OUTPATIENT)
Dept: NEUROLOGY | Facility: CLINIC | Age: 42
End: 2022-12-30

## 2023-01-10 ENCOUNTER — APPOINTMENT (OUTPATIENT)
Dept: INTERNAL MEDICINE | Facility: CLINIC | Age: 43
End: 2023-01-10
Payer: COMMERCIAL

## 2023-01-10 VITALS
SYSTOLIC BLOOD PRESSURE: 130 MMHG | DIASTOLIC BLOOD PRESSURE: 80 MMHG | HEART RATE: 63 BPM | HEIGHT: 62 IN | OXYGEN SATURATION: 99 % | TEMPERATURE: 97.5 F | BODY MASS INDEX: 30.36 KG/M2 | WEIGHT: 165 LBS

## 2023-01-10 DIAGNOSIS — M54.2 CERVICALGIA: ICD-10-CM

## 2023-01-10 DIAGNOSIS — Z86.19 PERSONAL HISTORY OF OTHER INFECTIOUS AND PARASITIC DISEASES: ICD-10-CM

## 2023-01-10 DIAGNOSIS — Z30.432 ENCOUNTER FOR REMOVAL OF INTRAUTERINE CONTRACEPTIVE DEVICE: ICD-10-CM

## 2023-01-10 DIAGNOSIS — N92.6 IRREGULAR MENSTRUATION, UNSPECIFIED: ICD-10-CM

## 2023-01-10 DIAGNOSIS — L02.31 CUTANEOUS ABSCESS OF BUTTOCK: ICD-10-CM

## 2023-01-10 DIAGNOSIS — Z87.19 PERSONAL HISTORY OF OTHER DISEASES OF THE DIGESTIVE SYSTEM: ICD-10-CM

## 2023-01-10 DIAGNOSIS — Z87.42 PERSONAL HISTORY OF OTHER DISEASES OF THE FEMALE GENITAL TRACT: ICD-10-CM

## 2023-01-10 DIAGNOSIS — Z87.898 PERSONAL HISTORY OF OTHER SPECIFIED CONDITIONS: ICD-10-CM

## 2023-01-10 DIAGNOSIS — Z87.2 PERSONAL HISTORY OF DISEASES OF THE SKIN AND SUBCUTANEOUS TISSUE: ICD-10-CM

## 2023-01-10 DIAGNOSIS — M79.89 OTHER SPECIFIED SOFT TISSUE DISORDERS: ICD-10-CM

## 2023-01-10 DIAGNOSIS — N94.89 OTHER SPECIFIED CONDITIONS ASSOCIATED WITH FEMALE GENITAL ORGANS AND MENSTRUAL CYCLE: ICD-10-CM
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
## 2023-01-10 DIAGNOSIS — F43.10 POST-TRAUMATIC STRESS DISORDER, UNSPECIFIED: ICD-10-CM

## 2023-01-10 DIAGNOSIS — N90.89 OTHER SPECIFIED NONINFLAMMATORY DISORDERS OF VULVA AND PERINEUM: ICD-10-CM

## 2023-01-10 PROCEDURE — 99072 ADDL SUPL MATRL&STAF TM PHE: CPT

## 2023-01-10 PROCEDURE — 99203 OFFICE O/P NEW LOW 30 MIN: CPT

## 2023-01-10 NOTE — HEALTH RISK ASSESSMENT
[Never] : Never [No] : No [0] : 2) Feeling down, depressed, or hopeless: Not at all (0) [PHQ-2 Negative - No further assessment needed] : PHQ-2 Negative - No further assessment needed [[id number]] : 0

## 2023-01-10 NOTE — PHYSICAL EXAM
[Normal] : no CVA tenderness, no spinal tenderness [de-identified] : tender post extension , acceptable ROM [de-identified] : L hand very tender and ++ swelling, tender finger movements [de-identified] : bruose L hand and wrist
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
## 2023-01-18 ENCOUNTER — APPOINTMENT (OUTPATIENT)
Dept: NEUROLOGY | Facility: CLINIC | Age: 43
End: 2023-01-18
Payer: MEDICAID

## 2023-01-18 VITALS
HEIGHT: 62 IN | BODY MASS INDEX: 31.1 KG/M2 | DIASTOLIC BLOOD PRESSURE: 70 MMHG | HEART RATE: 75 BPM | SYSTOLIC BLOOD PRESSURE: 118 MMHG | WEIGHT: 169 LBS | OXYGEN SATURATION: 99 %

## 2023-01-18 PROCEDURE — 99214 OFFICE O/P EST MOD 30 MIN: CPT

## 2023-01-18 NOTE — HISTORY OF PRESENT ILLNESS
[FreeTextEntry1] : Follow up\par \par \par Taken from Dr MICHAEL note\par \par Dinorah Reyes is a 41-year-old right-handed woman from  with a past medical history of clinical events concerning for seizure. She is presenting after hospital follow-up for a seizure. \par PMHX\par \par As per her report, her first seizure was in [place] in 2013. She was 33-years old at the time. She felt tired and fell asleep. Woke up making sudden involuntary movements with her hands, R>L. She could not control the movements. She bit her tongue. No drooling or difficulty breathing. She was aware of what was happening but had no control. She fell asleep and awoke again and her tongue was bruised. She went to [facility] in [facility] and was told it may have been a panic attack. She was sent home. Prior to the event, she had no illness. \par \par Second event was out of sleep in 2019, six years later. Her  was sleeping. She awoke out of sleep and had involuntary movements, maybe of right hand greater than left with mouth movements. The event lasted 15-20 seconds. No loss of consciousness, no loss of urine, bit both sides of tongue. She was evaluated by [facility] and was told t his may be a panic attack. \par \par Third seizure was on 1/31/21 around 6 :20 AM.  awoke and witnessed whole body convulsions and foaming at the mouth. He turned her onto her right side more than left. The event lasted 20-30 seconds and then stopped. She was breathing heavily and had lost consciousness. She felt sore afterwards. \par \par The last seizure was on 2/10/21. She was brought to [place] ED> She was postictal and unable to provide detailed history. As per chart notes, ED called  who reported she was having generalized tonic clonic seizure with foaming and the mouth and bit her tongue. Seizure lasted a few minutes. Upon arrival to ED, BP was 165/82. O2 96%. She was afebrile and tachycardic at 110. She had tongue bite on right side with edema. No sign of infection. MRI brain showed T2 nonenhancing hyperintense focus within the anterior right insula, unclear etiology. She was discharged on  mg bid. \par \par \par Seizure risk factors: \par No history of brain infection, no family history of epilepsy, no history of meningitis, encephalitis. No history of learning problems. Met developmental milestones okay. No history of episode of head injury with loss of consciousness. Did have episode of high fever, was hallucinating and had visual hallucinations in setting of delirium in the past. \par \par EMU admission at [facility] August 24-29. She had five seizures after levetiracetam was stopped and lamotrigine was halved. Seizures were focal to bilateral tonic clonic. \par \par Unfortunately all seizures had broad onset without definite right or left onset. There was rapid bisynchrony. Seizures lasted ~1 minute and occurred out of wake and sleep. There was diffuse EEG suppression at offset. \par \par Clinically seizures consisted of sudden forceful head turn to right and clonic movements, perhaps suggestive of left frontal onset. Then left arm extended out, suggestive of right SMA propagation. \par \par Seizures were variably more developed on the left or right frontotemporal region at offset.\par \par I concur w the hx and findings.\par The following details were obtained from interviewing the patient again:\par The seizures have occur only from sleep except when she was medication withdrawal at the EMU.  She has never had a seizure while awake.  She had 1 seizure that she describes this year with an aura of electrical feelings in her lower extremities that rise stop and then she lost consciousness.\par The  describes the seizures as convulsive events from sleep without any other component.\par Her last seizure was July 2021.  Since the current medic [place] regimen was optimized she has had no seizures and no side effects and feels great\par \par I reviewed the EEGs as well as the MRIs in details with the patient and the \par The EEG shows poor localization of the ictal onset and bilateral spread.\par The MRI shows a small lesion in the right insula consistent with possible previous injury or cortical dysplasia or an low grade tumor.\par Her neurologic examination is normal.\par \par HPI   1/18/2023\par \par Doing well\par No seizures x several yeares\par On Tx with Keppra and LTG\par No auras\par Last MRI 2022 showed no changes in insular lesion\par \par 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 Saw GYN but all normal

## 2023-01-18 NOTE — DISCUSSION/SUMMARY
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 Likely insular seizures\par Doing well. \par No seizures x 2 years\par \par

## 2023-01-18 NOTE — ASSESSMENT
[FreeTextEntry1] : Continue same doses of LEV/LTG\par Up VIT D to 2000 IU a day\par Follow up 4 months

## 2023-01-23 DIAGNOSIS — E55.9 VITAMIN D DEFICIENCY, UNSPECIFIED: ICD-10-CM

## 2023-01-23 RX ORDER — MULTIVIT-MIN/FOLIC/VIT K/LYCOP 400-300MCG
25 MCG TABLET ORAL
Qty: 90 | Refills: 3 | Status: ACTIVE | COMMUNITY
Start: 2023-01-23 | End: 1900-01-01

## 2023-01-23 RX ORDER — ERGOCALCIFEROL 1.25 MG/1
1.25 MG CAPSULE, LIQUID FILLED ORAL
Qty: 6 | Refills: 0 | Status: DISCONTINUED | COMMUNITY
Start: 2021-03-30 | End: 2023-01-23

## 2023-01-31 ENCOUNTER — APPOINTMENT (OUTPATIENT)
Dept: OBGYN | Facility: CLINIC | Age: 43
End: 2023-01-31
Payer: MEDICAID

## 2023-01-31 VITALS
SYSTOLIC BLOOD PRESSURE: 120 MMHG | WEIGHT: 168 LBS | HEIGHT: 62 IN | DIASTOLIC BLOOD PRESSURE: 80 MMHG | BODY MASS INDEX: 30.91 KG/M2

## 2023-01-31 DIAGNOSIS — Z11.51 ENCOUNTER FOR SCREENING FOR HUMAN PAPILLOMAVIRUS (HPV): ICD-10-CM

## 2023-01-31 DIAGNOSIS — Z12.4 ENCOUNTER FOR SCREENING FOR MALIGNANT NEOPLASM OF CERVIX: ICD-10-CM

## 2023-01-31 DIAGNOSIS — Z01.419 ENCOUNTER FOR GYNECOLOGICAL EXAMINATION (GENERAL) (ROUTINE) W/OUT ABNORMAL FINDINGS: ICD-10-CM

## 2023-01-31 DIAGNOSIS — Z92.89 PERSONAL HISTORY OF OTHER MEDICAL TREATMENT: ICD-10-CM

## 2023-01-31 DIAGNOSIS — N91.2 AMENORRHEA, UNSPECIFIED: ICD-10-CM

## 2023-01-31 DIAGNOSIS — Z00.00 ENCOUNTER FOR GENERAL ADULT MEDICAL EXAMINATION W/OUT ABNORMAL FINDINGS: ICD-10-CM

## 2023-01-31 DIAGNOSIS — Z76.89 PERSONS ENCOUNTERING HEALTH SERVICES IN OTHER SPECIFIED CIRCUMSTANCES: ICD-10-CM

## 2023-01-31 DIAGNOSIS — Z12.39 ENCOUNTER FOR OTHER SCREENING FOR MALIGNANT NEOPLASM OF BREAST: ICD-10-CM

## 2023-01-31 PROCEDURE — 99396 PREV VISIT EST AGE 40-64: CPT
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
## 2023-01-31 NOTE — PLAN
[FreeTextEntry1] : -Trial of hyaluronic acid topical to introitus TID.\par \par - Discussed options for management of mood/flashes/ vaginal discomfort.\par \par -May consider trial of systemic estrogen/vaginal estrogen after results of labs received.

## 2023-02-01 ENCOUNTER — RESULT REVIEW (OUTPATIENT)
Age: 43
End: 2023-02-01

## 2023-02-01 LAB
ESTRADIOL SERPL-MCNC: 7 PG/ML
FSH SERPL-MCNC: 62.1 IU/L
HPV HIGH+LOW RISK DNA PNL CVX: NOT DETECTED

## 2023-02-05 LAB — CYTOLOGY CVX/VAG DOC THIN PREP: ABNORMAL

## 2023-02-13 LAB — ANTI-MUELLERIAN HORMONE: <0.015 NG/ML

## 2023-04-24 ENCOUNTER — APPOINTMENT (OUTPATIENT)
Dept: NEUROLOGY | Facility: CLINIC | Age: 43
End: 2023-04-24
Payer: MEDICAID

## 2023-04-24 VITALS
OXYGEN SATURATION: 99 % | WEIGHT: 165 LBS | DIASTOLIC BLOOD PRESSURE: 79 MMHG | HEIGHT: 62 IN | HEART RATE: 62 BPM | SYSTOLIC BLOOD PRESSURE: 134 MMHG | BODY MASS INDEX: 30.36 KG/M2

## 2023-04-24 DIAGNOSIS — R53.83 OTHER FATIGUE: ICD-10-CM

## 2023-04-24 PROCEDURE — 99215 OFFICE O/P EST HI 40 MIN: CPT
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
## 2023-04-24 NOTE — DISCUSSION/SUMMARY
[FreeTextEntry1] : Gertrude is a pleasant 42-year-old RH woman with history of focal epilepsy. First seizure was at age 33, with second seizure years later. Saw Dr. Tejada for right insula lesion, doubt neoplastic - stable with serial imaging. Saw Dr. LEONARDO. Impression is that epilepsy may be related to insular lesion. \par \par Seizures appear to be focal to bilateral tonic clonic seizures. Occur out of sleep.\par \makayla Has had ~ 7 lifetime seizures. Most recent seizure 7/18/21 out of sleep. Prior to seizure, she may get a rush of tingling (insular) - this is followed by a generalized brief seizure with no significant post-ictal confusion. \par \par Appears to have seizures with variable propagation. Possible she has propagation anteriorly to the frontal lobe with focal to bilateral tonic clonic seizures. Left-sided tingling, electric like sensation may point to insular involvement. \par \par Ambulatory EEG 3/2021 with rare bilateral anterior temporal delta slowing in drowsiness, no epileptiform potential or seizures. \par \par She had admission to [facility] 8/24/21. 5 bilateral tonic clonic seizures out of wake and sleep when levetiracetam was lowered. Seizures consisted of abrupt right head turn followed by flexion of both arms with clonic movements - then left arm extension suggestive of right SMA involvement. \par \par Seizures were not well localized/ lateralized. Appear to be likely frontal with rapid bisynchrony. Lasted one minute. May be surgical candidate if seizures not well controlled - would need phase III intracranial study with coverage of bilateral frontotemporal regions including right insula followed by RNS or DBS. DBS may not be optimal given psychiatric comorbidity. Will need PET. Neuropsych testing -\par then will present in surgical conference. Will hold off on this for now as she is seizure free. \par \par Clinically, seizures all with similar semiology. Right head turn (appears forceful) with clonic movements --> followed by left arm extension may suggest left frontal onset with rapid propagation to right SMA. However, she has right insular lesion. So unclear if seizure starts there and propagates.\par \par  Seizures in the EMU  were not her typical seizures. She had clusters after medication reduction and they were all out of wake. Her typical seizures are only nocturnal. \par \par Doing well now with no seizures since July 2021. Feeling good on lamotrigine. Sleep is good. Appears that levetiracetam may be most efficacious for seizures and that lamotrigine is helping as well. Lamotrigine is also helping her mood. \par \par Counseled extensively on seizure safety. Driving, short distances. \par  \par

## 2023-04-24 NOTE — DATA REVIEWED
[de-identified] : 5/18/22: MRI brain with and without contrast: T2 nonenhancing, non masslike hyperintense focus within the right insula unchanged \par in appearance from May 2021. Possible consideration includes epileptogenic /postictal focus, gliosis from prior ischemia versus \par postinfectious/post inflammatory process such as autoimmune encephalitis. No enhancing lesion. \par 2/10/21: MRI brain with and without contrast: T2 nonenhancing non masslike hyperintense focus, centered within the anterior right insula corresponding to findings seen on recently performed CT head, nonspecific. Possible diagnostic consideration include prominent focus of gliosis from prior ischemia versus postinfectious/post inflammatory process or post ictal focus. Low - grade glial neoplasm is considered unlikely given morphology and lack of mass effect and enhancement but not entirely excluded. Follow up suggested. No evidence of mesial temporal sclerosis.  [de-identified] : aEEG : 3/19-3/20/21: rare bilateral anterior temporal delta slowing suggestive of mild focal dysfunction in these regions. Low amplitude EEG. \par EMU: 8/24/29 - [facility] : 5 clinical seizures of broad onset, possible left frontal with rapid bisynchrony. rare left frontal spikes, maximal at F3 > right frontal spikes. Fp2.  [de-identified] : 2/10/21: Creatinine 0.8, AST ALT Alk phos within normal limits \par 6/2021: LEV level 6.5, vitamin D level 25.3 low , clobazam level 250, \par 7/18/21: lamotrigine level 3.8 (2-20), levetiracetam level 37.1 \par 7/18/21: creatinine level 0.7

## 2023-04-24 NOTE — PHYSICAL EXAM
[FreeTextEntry1] : Musculoskeletal: Reports some pain with vertical up and down as well as side to side rotation of the head \par Mental Status: euthymic mood, gives detailed history, speech fluent \par CN: visual acuity, \par III, IV, VI, PERRL, EOMI \par V sensation normal to light touch\par VII normal squint vs resistance, normal smile, face symmetric \par VIII: normal hearing \par IX, X normal gag, symmetric palate, uvula raises midline \par XI normal shrug versus resistance and lateralization of head versus resistance \par XII tongue symmetric, normal strength, no tremor or fasciculation \par Motor: full strength \par Sensory: normal to light touch b/l\par Reflexes: brachioradialis and biceps b/l 1+, triceps 2+ b/l, patellar 1+ b/l, ankle jerks 1+ \par Gait : normal balance and gait\par .

## 2023-04-24 NOTE — ASSESSMENT
[FreeTextEntry1] : Please get labwork today\par Please get EEG June 2023\par Please get MRI brain w/wo contrast - you can call me to review results \par Please continue lamotrigine 250 mg in the morning and 200 mg at night \par Please continue levetiracetam 1000 mg twice a day \par continue vitamin d 1000 units daily\par Please get copies of discs and reports from outside spine studies and schedule a follow-up with Dr. Tejada after you receive these - not urgent \par \par Next visit with Dr. LEONARDO in July or August 2023 \par \par . \par

## 2023-04-28 LAB
25(OH)D3 SERPL-MCNC: 26.6 NG/ML
FERRITIN SERPL-MCNC: 16 NG/ML
HCT VFR BLD CALC: 38.8 %
HGB BLD-MCNC: 11.9 G/DL
LAMOTRIGINE SERPL-MCNC: 11.4 UG/ML
MCHC RBC-ENTMCNC: 27.9 PG
MCHC RBC-ENTMCNC: 30.7 GM/DL
MCV RBC AUTO: 91.1 FL
PLATELET # BLD AUTO: 258 K/UL
RBC # BLD: 4.26 M/UL
RBC # FLD: 13.3 %
VIT B12 SERPL-MCNC: 780 PG/ML
WBC # FLD AUTO: 5.21 K/UL

## 2023-05-01 LAB
ALBUMIN SERPL ELPH-MCNC: 4.8 G/DL
ALP BLD-CCNC: 61 U/L
ALT SERPL-CCNC: 16 U/L
ANION GAP SERPL CALC-SCNC: 15 MMOL/L
AST SERPL-CCNC: 19 U/L
BILIRUB SERPL-MCNC: 0.3 MG/DL
BUN SERPL-MCNC: 9 MG/DL
CALCIUM SERPL-MCNC: 9.6 MG/DL
CHLORIDE SERPL-SCNC: 101 MMOL/L
CO2 SERPL-SCNC: 25 MMOL/L
CREAT SERPL-MCNC: 0.69 MG/DL
EGFR: 112 ML/MIN/1.73M2
GLUCOSE SERPL-MCNC: 78 MG/DL
IRON SATN MFR SERPL: 21 %
IRON SERPL-MCNC: 80 UG/DL
LEVETIRACETAM SERPL-MCNC: 23.1 UG/ML
POTASSIUM SERPL-SCNC: 3.9 MMOL/L
PROT SERPL-MCNC: 6.9 G/DL
SODIUM SERPL-SCNC: 140 MMOL/L
TIBC SERPL-MCNC: 393 UG/DL
TSH SERPL-ACNC: 0.68 UIU/ML
UIBC SERPL-MCNC: 312 UG/DL

## 2023-05-07 ENCOUNTER — RX RENEWAL (OUTPATIENT)
Age: 43
End: 2023-05-07

## 2023-05-11 ENCOUNTER — APPOINTMENT (OUTPATIENT)
Dept: NEUROLOGY | Facility: CLINIC | Age: 43
End: 2023-05-11
Payer: MEDICAID

## 2023-05-11 PROCEDURE — 95816 EEG AWAKE AND DROWSY: CPT

## 2023-05-12 ENCOUNTER — APPOINTMENT (OUTPATIENT)
Dept: NEUROLOGY | Facility: CLINIC | Age: 43
End: 2023-05-12

## 2023-05-12 PROCEDURE — 95719 EEG PHYS/QHP EA INCR W/O VID: CPT

## 2023-05-12 PROCEDURE — 95708 EEG WO VID EA 12-26HR UNMNTR: CPT

## 2023-05-12 PROCEDURE — 95700 EEG CONT REC W/VID EEG TECH: CPT

## 2023-07-12 ENCOUNTER — RX RENEWAL (OUTPATIENT)
Age: 43
End: 2023-07-12

## 2023-07-16 ENCOUNTER — RESULT REVIEW (OUTPATIENT)
Age: 43
End: 2023-07-16

## 2023-07-19 ENCOUNTER — APPOINTMENT (OUTPATIENT)
Dept: NEUROLOGY | Facility: CLINIC | Age: 43
End: 2023-07-19

## 2023-07-28 RX ORDER — LAMOTRIGINE 25 MG/1
25 TABLET ORAL
Qty: 180 | Refills: 3 | Status: ACTIVE | COMMUNITY
Start: 2023-01-03 | End: 1900-01-01

## 2023-09-13 ENCOUNTER — APPOINTMENT (OUTPATIENT)
Dept: NEUROLOGY | Facility: CLINIC | Age: 43
End: 2023-09-13
Payer: MEDICAID

## 2023-09-13 VITALS
BODY MASS INDEX: 31.15 KG/M2 | HEIGHT: 61 IN | DIASTOLIC BLOOD PRESSURE: 83 MMHG | SYSTOLIC BLOOD PRESSURE: 125 MMHG | WEIGHT: 165 LBS | HEART RATE: 61 BPM

## 2023-09-13 PROCEDURE — 99214 OFFICE O/P EST MOD 30 MIN: CPT

## 2023-10-31 ENCOUNTER — APPOINTMENT (OUTPATIENT)
Dept: CARDIOLOGY | Facility: CLINIC | Age: 43
End: 2023-10-31
Payer: MEDICAID

## 2023-10-31 ENCOUNTER — NON-APPOINTMENT (OUTPATIENT)
Age: 43
End: 2023-10-31

## 2023-10-31 VITALS
DIASTOLIC BLOOD PRESSURE: 70 MMHG | BODY MASS INDEX: 31.72 KG/M2 | SYSTOLIC BLOOD PRESSURE: 115 MMHG | OXYGEN SATURATION: 99 % | WEIGHT: 168 LBS | HEIGHT: 61 IN | HEART RATE: 83 BPM

## 2023-10-31 DIAGNOSIS — R73.03 PREDIABETES.: ICD-10-CM

## 2023-10-31 PROCEDURE — 99204 OFFICE O/P NEW MOD 45 MIN: CPT | Mod: 25

## 2023-10-31 PROCEDURE — 93000 ELECTROCARDIOGRAM COMPLETE: CPT

## 2023-11-02 ENCOUNTER — RX RENEWAL (OUTPATIENT)
Age: 43
End: 2023-11-02

## 2023-11-02 PROBLEM — R73.03 PRE-DIABETES: Status: ACTIVE | Noted: 2023-11-02

## 2023-11-02 RX ORDER — CHLORHEXIDINE GLUCONATE 4 %
400 LIQUID (ML) TOPICAL DAILY
Qty: 90 | Refills: 0 | Status: ACTIVE | COMMUNITY
Start: 2022-08-30 | End: 1900-01-01

## 2023-11-07 ENCOUNTER — NON-APPOINTMENT (OUTPATIENT)
Age: 43
End: 2023-11-07

## 2023-12-01 ENCOUNTER — APPOINTMENT (OUTPATIENT)
Dept: CARDIOLOGY | Facility: CLINIC | Age: 43
End: 2023-12-01
Payer: MEDICAID

## 2023-12-01 DIAGNOSIS — R07.89 OTHER CHEST PAIN: ICD-10-CM

## 2023-12-01 DIAGNOSIS — Z92.89 PERSONAL HISTORY OF OTHER MEDICAL TREATMENT: ICD-10-CM

## 2023-12-01 PROCEDURE — 93015 CV STRESS TEST SUPVJ I&R: CPT

## 2023-12-05 ENCOUNTER — NON-APPOINTMENT (OUTPATIENT)
Age: 43
End: 2023-12-05

## 2023-12-13 ENCOUNTER — APPOINTMENT (OUTPATIENT)
Dept: CARDIOLOGY | Facility: CLINIC | Age: 43
End: 2023-12-13

## 2024-03-13 ENCOUNTER — APPOINTMENT (OUTPATIENT)
Dept: NEUROLOGY | Facility: CLINIC | Age: 44
End: 2024-03-13

## 2024-04-17 ENCOUNTER — APPOINTMENT (OUTPATIENT)
Dept: NEUROLOGY | Facility: CLINIC | Age: 44
End: 2024-04-17
Payer: MEDICAID

## 2024-04-17 VITALS
BODY MASS INDEX: 31.72 KG/M2 | HEIGHT: 61 IN | SYSTOLIC BLOOD PRESSURE: 112 MMHG | WEIGHT: 168 LBS | OXYGEN SATURATION: 98 % | HEART RATE: 71 BPM | DIASTOLIC BLOOD PRESSURE: 72 MMHG

## 2024-04-17 DIAGNOSIS — G40.109 LOCALIZATION-RELATED (FOCAL) (PARTIAL) SYMPTOMATIC EPILEPSY AND EPILEPTIC SYNDROMES WITH SIMPLE PARTIAL SEIZURES, NOT INTRACTABLE, W/OUT STATUS EPILEPTICUS: ICD-10-CM

## 2024-04-17 DIAGNOSIS — G93.9 DISORDER OF BRAIN, UNSPECIFIED: ICD-10-CM

## 2024-04-17 PROCEDURE — G2211 COMPLEX E/M VISIT ADD ON: CPT | Mod: NC,1L

## 2024-04-17 PROCEDURE — 99214 OFFICE O/P EST MOD 30 MIN: CPT

## 2024-04-17 NOTE — HISTORY OF PRESENT ILLNESS
[FreeTextEntry1] : Follow up   Dinorah Reyes is a 41-year-old right-handed woman from  with a past medical history of clinical events concerning for seizure. She is presenting after hospital follow-up for a seizure.  PMHX  As per her report, her first seizure was in [place] in 2013. She was 33-years old at the time. She felt tired and fell asleep. Woke up making sudden involuntary movements with her hands, R>L. She could not control the movements. She bit her tongue. No drooling or difficulty breathing. She was aware of what was happening but had no control. She fell asleep and awoke again and her tongue was bruised. She went to [facility] in [facility] and was told it may have been a panic attack. She was sent home. Prior to the event, she had no illness.   Second event was out of sleep in 2019, six years later. Her  was sleeping. She awoke out of sleep and had involuntary movements, maybe of right hand greater than left with mouth movements. The event lasted 15-20 seconds. No loss of consciousness, no loss of urine, bit both sides of tongue. She was evaluated by [facility] and was told t his may be a panic attack.   Third seizure was on 1/31/21 around 6 :20 AM.  awoke and witnessed whole body convulsions and foaming at the mouth. He turned her onto her right side more than left. The event lasted 20-30 seconds and then stopped. She was breathing heavily and had lost consciousness. She felt sore afterwards.   The last seizure was on 2/10/21. She was brought to [facility]> She was postictal and unable to provide detailed history. As per chart notes, ED called  who reported she was having generalized tonic clonic seizure with foaming and the mouth and bit her tongue. Seizure lasted a few minutes. Upon arrival to ED, BP was 165/82. O2 96%. She was afebrile and tachycardic at 110. She had tongue bite on right side with edema. No sign of infection. MRI brain showed T2 nonenhancing hyperintense focus within the anterior right insula, unclear etiology. She was discharged on  mg bid.    Seizure risk factors:  No history of brain infection, no family history of epilepsy, no history of meningitis, encephalitis. No history of learning problems. Met developmental milestones okay. No history of episode of head injury with loss of consciousness. Did have episode of high fever, was hallucinating and had visual hallucinations in setting of delirium in the past.   EMU admission at [facility] August 24-29. She had five seizures after levetiracetam was stopped and lamotrigine was halved. Seizures were focal to bilateral tonic clonic.   Unfortunately all seizures had broad onset without definite right or left onset. There was rapid bisynchrony. Seizures lasted ~1 minute and occurred out of wake and sleep. There was diffuse EEG suppression at offset.   Clinically seizures consisted of sudden forceful head turn to right and clonic movements, perhaps suggestive of left frontal onset. Then left arm extended out, suggestive of right SMA propagation.   Seizures were variably more developed on the left or right frontotemporal region at offset.  I concur w the hx and findings. The following details were obtained from interviewing the patient again: The seizures have occur only from sleep except when she was medication withdrawal at the EMU.  She has never had a seizure while awake.  She had 1 seizure that she describes this year with an aura of electrical feelings in her lower extremities that rise stop and then she lost consciousness. The  describes the seizures as convulsive events from sleep without any other component. Her last seizure was July 2021.  Since the current medic Acacian regimen was optimized she has had no seizures and no side effects and feels great  I reviewed the EEGs as well as the MRIs in details with the patient and the  The EEG shows poor localization of the ictal onset and bilateral spread. The MRI shows a small lesion in the right insula consistent with possible previous injury or cortical dysplasia or an low grade tumor. Her neurologic examination is normal.   HPI    4/17/24  No seizures x 2.5 years No neuro issues Weight is an issue Mood good Works well.   Meds  /250 Keppra 1000 mg BID Folic acid 1 mg/day PRN HPI    9/13/23  No seizures x 2 years Feeling well No side effects. No periods x 2 year. Now had one.  MRI: No changes  Meds  /250 Keppra 1000 mg BID Folic acid 1 mg/day PRN HPI   1/18/2023  Doing well No seizures x several yeares On Tx with Keppra and LTG No auras Last MRI 2022 showed no changes in insular lesion  Working home attend Drives Vit D low levels despite 1000 IU a day Only problem also lack of MC x 1 year. Saw GYN but all normal

## 2024-04-17 NOTE — ASSESSMENT
[FreeTextEntry1] : Focal epilepsy (345.50) (G40.109) Brain lesion (348.89) (G93.9). Stable Continue meds same doses of LTG and Keppra RTC in 6 months w RTC and EEG

## 2024-05-02 ENCOUNTER — RX RENEWAL (OUTPATIENT)
Age: 44
End: 2024-05-02

## 2024-05-02 RX ORDER — LEVETIRACETAM 1000 MG/1
1000 TABLET, FILM COATED ORAL
Qty: 180 | Refills: 3 | Status: ACTIVE | COMMUNITY
Start: 2022-08-30 | End: 1900-01-01

## 2024-05-02 RX ORDER — LAMOTRIGINE 200 MG/1
200 TABLET ORAL TWICE DAILY
Qty: 180 | Refills: 2 | Status: ACTIVE | COMMUNITY
Start: 2022-11-14 | End: 1900-01-01

## 2024-07-11 ENCOUNTER — RESULT REVIEW (OUTPATIENT)
Age: 44
End: 2024-07-11

## 2024-08-28 ENCOUNTER — APPOINTMENT (OUTPATIENT)
Dept: NEUROLOGY | Facility: CLINIC | Age: 44
End: 2024-08-28
Payer: MEDICAID

## 2024-08-28 VITALS
HEART RATE: 63 BPM | DIASTOLIC BLOOD PRESSURE: 80 MMHG | SYSTOLIC BLOOD PRESSURE: 121 MMHG | OXYGEN SATURATION: 96 % | WEIGHT: 170 LBS | BODY MASS INDEX: 32.12 KG/M2
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
## 2024-08-28 DIAGNOSIS — G93.9 DISORDER OF BRAIN, UNSPECIFIED: ICD-10-CM

## 2024-08-28 DIAGNOSIS — G40.109 LOCALIZATION-RELATED (FOCAL) (PARTIAL) SYMPTOMATIC EPILEPSY AND EPILEPTIC SYNDROMES WITH SIMPLE PARTIAL SEIZURES, NOT INTRACTABLE, W/OUT STATUS EPILEPTICUS: ICD-10-CM

## 2024-08-28 PROCEDURE — 99214 OFFICE O/P EST MOD 30 MIN: CPT

## 2024-08-28 PROCEDURE — 95819 EEG AWAKE AND ASLEEP: CPT

## 2024-08-28 PROCEDURE — G2211 COMPLEX E/M VISIT ADD ON: CPT | Mod: NC

## 2024-08-28 NOTE — HISTORY OF PRESENT ILLNESS
[FreeTextEntry1] : Follow up   Dinorah Reyes is a 41-year-old right-handed woman from  with a past medical history of clinical events concerning for seizure. She is presenting after hospital follow-up for a seizure.  PMHX  As per her report, her first seizure was in [place] in 2013. She was 33-years old at the time. She felt tired and fell asleep. Woke up making sudden involuntary movements with her hands, R>L. She could not control the movements. She bit her tongue. No drooling or difficulty breathing. She was aware of what was happening but had no control. She fell asleep and awoke again and her tongue was bruised. She went to [facility] in [facility] and was told it may have been a panic attack. She was sent home. Prior to the event, she had no illness.   Second event was out of sleep in 2019, six years later. Her  was sleeping. She awoke out of sleep and had involuntary movements, maybe of right hand greater than left with mouth movements. The event lasted 15-20 seconds. No loss of consciousness, no loss of urine, bit both sides of tongue. She was evaluated by [facility] and was told t his may be a panic attack.   Third seizure was on 1/31/21 around 6 :20 AM.  awoke and witnessed whole body convulsions and foaming at the mouth. He turned her onto her right side more than left. The event lasted 20-30 seconds and then stopped. She was breathing heavily and had lost consciousness. She felt sore afterwards.   The last seizure was on 2/10/21. She was brought to [place] ED> She was postictal and unable to provide detailed history. As per chart notes, ED called  who reported she was having generalized tonic clonic seizure with foaming and the mouth and bit her tongue. Seizure lasted a few minutes. Upon arrival to ED, BP was 165/82. O2 96%. She was afebrile and tachycardic at 110. She had tongue bite on right side with edema. No sign of infection. MRI brain showed T2 nonenhancing hyperintense focus within the anterior right insula, unclear etiology. She was discharged on  mg bid.    Seizure risk factors:  No history of brain infection, no family history of epilepsy, no history of meningitis, encephalitis. No history of learning problems. Met developmental milestones okay. No history of episode of head injury with loss of consciousness. Did have episode of high fever, was hallucinating and had visual hallucinations in setting of delirium in the past.   EMU admission at [facility] August 24-29. She had five seizures after levetiracetam was stopped and lamotrigine was halved. Seizures were focal to bilateral tonic clonic.   Unfortunately all seizures had broad onset without definite right or left onset. There was rapid bisynchrony. Seizures lasted ~1 minute and occurred out of wake and sleep. There was diffuse EEG suppression at offset.   Clinically seizures consisted of sudden forceful head turn to right and clonic movements, perhaps suggestive of left frontal onset. Then left arm extended out, suggestive of right SMA propagation.   Seizures were variably more developed on the left or right frontotemporal region at offset.  I concur w the hx and findings. The following details were obtained from interviewing the patient again: The seizures have occur only from sleep except when she was medication withdrawal at the EMU.  She has never had a seizure while awake.  She had 1 seizure that she describes this year with an aura of electrical feelings in her lower extremities that rise stop and then she lost consciousness. The  describes the seizures as convulsive events from sleep without any other component. Her last seizure was July 2021.  Since the current medic Acacian regimen was optimized she has had no seizures and no side effects and feels great  I reviewed the EEGs as well as the MRIs in details with the patient and the  The EEG shows poor localization of the ictal onset and bilateral spread. The MRI shows a small lesion in the right insula consistent with possible previous injury or cortical dysplasia or an low grade tumor. Her neurologic examination is normal.  HPI    8/28/24  Doing well No sz No headaches Mom has mayor medical issues Stress  Working less  MRI No changes EEG: normal Headaches  Meds /250 Keppra 1000 mg BID Folic acid 1 mg/day PRN   LTG  HPI    4/17/24  No seizures x 2.5 years No neuro issues Weight is an issue Mood good Works well.   Meds  /250 Keppra 1000 mg BID Folic acid 1 mg/day PRN HPI    9/13/23  No seizures x 2 years Feeling well No side effects. No periods x 2 year. Now had one.  MRI: No changes  Meds  /250 Keppra 1000 mg BID Folic acid 1 mg/day PRN HPI   1/18/2023  Doing well No seizures x several yeares On Tx with Keppra and LTG No auras Last MRI 2022 showed no changes in insular lesion  Working home attend Drives Vit D low levels despite 1000 IU a day Only problem also lack of MC x 1 year. Saw GYN but all normal

## 2024-08-28 NOTE — REVIEW OF SYSTEMS
[Feeling Poorly] : feeling poorly [Feeling Tired] : feeling tired [Seizures] : convulsions [Anxiety] : anxiety [Depression] : depression

## 2024-08-28 NOTE — ASSESSMENT
[FreeTextEntry1] : Use valeriana for sleep\ Continue LTG/Keppra Weight control Mag Rx  Vit D Rx  RTC in 6 months

## 2025-02-19 ENCOUNTER — APPOINTMENT (OUTPATIENT)
Dept: NEUROLOGY | Facility: CLINIC | Age: 45
End: 2025-02-19
Payer: MEDICAID

## 2025-02-19 VITALS
HEIGHT: 61 IN | SYSTOLIC BLOOD PRESSURE: 121 MMHG | BODY MASS INDEX: 35.87 KG/M2 | HEART RATE: 67 BPM | DIASTOLIC BLOOD PRESSURE: 80 MMHG | WEIGHT: 190 LBS | OXYGEN SATURATION: 99 %

## 2025-02-19 DIAGNOSIS — G40.109 LOCALIZATION-RELATED (FOCAL) (PARTIAL) SYMPTOMATIC EPILEPSY AND EPILEPTIC SYNDROMES WITH SIMPLE PARTIAL SEIZURES, NOT INTRACTABLE, W/OUT STATUS EPILEPTICUS: ICD-10-CM

## 2025-02-19 DIAGNOSIS — G93.9 DISORDER OF BRAIN, UNSPECIFIED: ICD-10-CM

## 2025-02-19 PROCEDURE — G2211 COMPLEX E/M VISIT ADD ON: CPT | Mod: NC

## 2025-02-19 PROCEDURE — 99214 OFFICE O/P EST MOD 30 MIN: CPT

## 2025-06-03 DIAGNOSIS — G40.109 LOCALIZATION-RELATED (FOCAL) (PARTIAL) SYMPTOMATIC EPILEPSY AND EPILEPTIC SYNDROMES WITH SIMPLE PARTIAL SEIZURES, NOT INTRACTABLE, W/OUT STATUS EPILEPTICUS: ICD-10-CM

## 2025-06-03 DIAGNOSIS — G93.9 DISORDER OF BRAIN, UNSPECIFIED: ICD-10-CM

## 2025-06-19 ENCOUNTER — NON-APPOINTMENT (OUTPATIENT)
Age: 45
End: 2025-06-19

## 2025-07-21 ENCOUNTER — RESULT REVIEW (OUTPATIENT)
Age: 45
End: 2025-07-21

## 2025-07-29 ENCOUNTER — RESULT REVIEW (OUTPATIENT)
Age: 45
End: 2025-07-29

## 2025-07-29 ENCOUNTER — APPOINTMENT (OUTPATIENT)
Dept: RHEUMATOLOGY | Facility: CLINIC | Age: 45
End: 2025-07-29
Payer: MEDICAID

## 2025-07-29 VITALS
DIASTOLIC BLOOD PRESSURE: 76 MMHG | WEIGHT: 190 LBS | OXYGEN SATURATION: 99 % | HEART RATE: 80 BPM | SYSTOLIC BLOOD PRESSURE: 118 MMHG | BODY MASS INDEX: 35.87 KG/M2 | HEIGHT: 61 IN

## 2025-07-29 DIAGNOSIS — M54.50 LOW BACK PAIN, UNSPECIFIED: ICD-10-CM

## 2025-07-29 DIAGNOSIS — M19.90 UNSPECIFIED OSTEOARTHRITIS, UNSPECIFIED SITE: ICD-10-CM

## 2025-07-29 DIAGNOSIS — G89.29 LOW BACK PAIN, UNSPECIFIED: ICD-10-CM

## 2025-07-29 DIAGNOSIS — G89.29 CERVICALGIA: ICD-10-CM

## 2025-07-29 DIAGNOSIS — M54.2 CERVICALGIA: ICD-10-CM

## 2025-07-29 PROCEDURE — 99204 OFFICE O/P NEW MOD 45 MIN: CPT

## 2025-07-29 RX ORDER — CYCLOBENZAPRINE 10 MG/1
10 TABLET ORAL
Qty: 10 | Refills: 0 | Status: COMPLETED | COMMUNITY
Start: 2025-07-29 | End: 2025-08-08

## 2025-07-29 RX ORDER — HYDROXYCHLOROQUINE SULFATE 200 MG/1
200 TABLET, FILM COATED ORAL
Refills: 0 | Status: ACTIVE | COMMUNITY

## 2025-07-29 RX ORDER — PREDNISONE 10 MG/1
10 TABLET ORAL
Refills: 0 | Status: ACTIVE | COMMUNITY

## 2025-07-29 RX ORDER — MELOXICAM 15 MG/1
15 TABLET ORAL
Qty: 21 | Refills: 2 | Status: ACTIVE | COMMUNITY
Start: 2025-07-29 | End: 2025-09-30

## 2025-07-30 LAB
ALBUMIN SERPL ELPH-MCNC: 4.9 G/DL
ALP BLD-CCNC: 69 U/L
ALT SERPL-CCNC: 16 U/L
ANION GAP SERPL CALC-SCNC: 14 MMOL/L
AST SERPL-CCNC: 18 U/L
BASOPHILS # BLD AUTO: 0.03 K/UL
BASOPHILS NFR BLD AUTO: 0.5 %
BILIRUB SERPL-MCNC: 0.2 MG/DL
BUN SERPL-MCNC: 12 MG/DL
CALCIUM SERPL-MCNC: 9.9 MG/DL
CCP AB SER IA-ACNC: 118 U/ML
CCP AB SER QL: POSITIVE
CHLORIDE SERPL-SCNC: 103 MMOL/L
CO2 SERPL-SCNC: 25 MMOL/L
CREAT SERPL-MCNC: 0.68 MG/DL
EGFRCR SERPLBLD CKD-EPI 2021: 110 ML/MIN/1.73M2
EOSINOPHIL # BLD AUTO: 0.17 K/UL
EOSINOPHIL NFR BLD AUTO: 3 %
GLUCOSE SERPL-MCNC: 97 MG/DL
HCT VFR BLD CALC: 39.1 %
HGB BLD-MCNC: 11.6 G/DL
IMM GRANULOCYTES NFR BLD AUTO: 0.4 %
LYMPHOCYTES # BLD AUTO: 1.68 K/UL
LYMPHOCYTES NFR BLD AUTO: 29.8 %
MAN DIFF?: NORMAL
MCHC RBC-ENTMCNC: 27.4 PG
MCHC RBC-ENTMCNC: 29.7 G/DL
MCV RBC AUTO: 92.2 FL
MONOCYTES # BLD AUTO: 0.37 K/UL
MONOCYTES NFR BLD AUTO: 6.6 %
NEUTROPHILS # BLD AUTO: 3.36 K/UL
NEUTROPHILS NFR BLD AUTO: 59.7 %
PLATELET # BLD AUTO: 267 K/UL
POTASSIUM SERPL-SCNC: 3.8 MMOL/L
PROT SERPL-MCNC: 7 G/DL
RBC # BLD: 4.24 M/UL
RBC # FLD: 14.7 %
RHEUMATOID FACT SERPL-ACNC: <10 IU/ML
SODIUM SERPL-SCNC: 142 MMOL/L
WBC # FLD AUTO: 5.63 K/UL

## 2025-08-20 ENCOUNTER — APPOINTMENT (OUTPATIENT)
Dept: NEUROLOGY | Facility: CLINIC | Age: 45
End: 2025-08-20
Payer: MEDICAID

## 2025-08-20 VITALS
DIASTOLIC BLOOD PRESSURE: 85 MMHG | HEART RATE: 69 BPM | SYSTOLIC BLOOD PRESSURE: 143 MMHG | WEIGHT: 168 LBS | OXYGEN SATURATION: 98 % | BODY MASS INDEX: 31.74 KG/M2

## 2025-08-20 DIAGNOSIS — R53.83 OTHER FATIGUE: ICD-10-CM

## 2025-08-20 DIAGNOSIS — G40.109 LOCALIZATION-RELATED (FOCAL) (PARTIAL) SYMPTOMATIC EPILEPSY AND EPILEPTIC SYNDROMES WITH SIMPLE PARTIAL SEIZURES, NOT INTRACTABLE, W/OUT STATUS EPILEPTICUS: ICD-10-CM

## 2025-08-20 DIAGNOSIS — G93.9 DISORDER OF BRAIN, UNSPECIFIED: ICD-10-CM

## 2025-08-20 PROCEDURE — 99214 OFFICE O/P EST MOD 30 MIN: CPT

## 2025-08-20 PROCEDURE — 95819 EEG AWAKE AND ASLEEP: CPT

## 2025-08-20 RX ORDER — LEVETIRACETAM 1000 MG/1
1000 TABLET, FILM COATED ORAL
Refills: 0 | Status: ACTIVE | COMMUNITY

## 2025-08-20 RX ORDER — LAMOTRIGINE 200 MG/1
200 TABLET ORAL
Refills: 0 | Status: ACTIVE | COMMUNITY

## 2025-08-20 RX ORDER — FAMOTIDINE 40 MG/1
40 TABLET, FILM COATED ORAL
Refills: 0 | Status: ACTIVE | COMMUNITY

## 2025-08-23 LAB — LEVETIRACETAM SERPL-MCNC: 16.4 UG/ML

## 2025-08-26 LAB — LAMOTRIGINE SERPL-MCNC: 8.5 UG/ML

## 2025-09-19 ENCOUNTER — APPOINTMENT (OUTPATIENT)
Dept: ORTHOPEDIC SURGERY | Facility: CLINIC | Age: 45
End: 2025-09-19
Payer: MEDICAID

## 2025-09-19 VITALS
OXYGEN SATURATION: 98 % | HEIGHT: 61 IN | HEART RATE: 75 BPM | SYSTOLIC BLOOD PRESSURE: 137 MMHG | WEIGHT: 168 LBS | BODY MASS INDEX: 31.72 KG/M2 | DIASTOLIC BLOOD PRESSURE: 84 MMHG

## 2025-09-19 DIAGNOSIS — M54.12 RADICULOPATHY, CERVICAL REGION: ICD-10-CM

## 2025-09-19 DIAGNOSIS — M54.16 RADICULOPATHY, LUMBAR REGION: ICD-10-CM

## 2025-09-19 PROCEDURE — 99204 OFFICE O/P NEW MOD 45 MIN: CPT

## 2025-09-19 PROCEDURE — G2211 COMPLEX E/M VISIT ADD ON: CPT | Mod: NC

## 2025-09-19 RX ORDER — TIZANIDINE 2 MG/1
2 TABLET ORAL
Qty: 30 | Refills: 0 | Status: ACTIVE | COMMUNITY
Start: 2025-09-19 | End: 1900-01-01